# Patient Record
Sex: MALE | Race: WHITE | NOT HISPANIC OR LATINO | Employment: FULL TIME | URBAN - METROPOLITAN AREA
[De-identification: names, ages, dates, MRNs, and addresses within clinical notes are randomized per-mention and may not be internally consistent; named-entity substitution may affect disease eponyms.]

---

## 2018-01-04 ENCOUNTER — OFFICE VISIT (OUTPATIENT)
Dept: URGENT CARE | Facility: CLINIC | Age: 43
End: 2018-01-04
Payer: COMMERCIAL

## 2018-01-04 VITALS
DIASTOLIC BLOOD PRESSURE: 64 MMHG | RESPIRATION RATE: 16 BRPM | HEIGHT: 68 IN | HEART RATE: 72 BPM | BODY MASS INDEX: 34.25 KG/M2 | OXYGEN SATURATION: 99 % | SYSTOLIC BLOOD PRESSURE: 128 MMHG | WEIGHT: 226 LBS | TEMPERATURE: 97.8 F

## 2018-01-04 DIAGNOSIS — H66.002 ACUTE SUPPURATIVE OTITIS MEDIA OF LEFT EAR WITHOUT SPONTANEOUS RUPTURE OF TYMPANIC MEMBRANE, RECURRENCE NOT SPECIFIED: ICD-10-CM

## 2018-01-04 DIAGNOSIS — E66.9 OBESITY (BMI 30-39.9): ICD-10-CM

## 2018-01-04 PROCEDURE — 99214 OFFICE O/P EST MOD 30 MIN: CPT | Performed by: PHYSICIAN ASSISTANT

## 2018-01-04 RX ORDER — AMOXICILLIN AND CLAVULANATE POTASSIUM 875; 125 MG/1; MG/1
1 TABLET, FILM COATED ORAL 2 TIMES DAILY
Qty: 14 TAB | Refills: 0 | Status: SHIPPED | OUTPATIENT
Start: 2018-01-04 | End: 2018-01-04 | Stop reason: SDUPTHER

## 2018-01-04 RX ORDER — AMOXICILLIN AND CLAVULANATE POTASSIUM 875; 125 MG/1; MG/1
1 TABLET, FILM COATED ORAL 2 TIMES DAILY
Qty: 14 TAB | Refills: 0 | Status: SHIPPED | OUTPATIENT
Start: 2018-01-04 | End: 2018-01-11

## 2018-01-04 ASSESSMENT — ENCOUNTER SYMPTOMS
CHILLS: 0
FEVER: 0
COUGH: 1
SHORTNESS OF BREATH: 0
SORE THROAT: 1
EYE PAIN: 0
PALPITATIONS: 0

## 2018-01-04 NOTE — PROGRESS NOTES
Subjective:      Moncho Mccall is a 42 y.o. male who presents with Otalgia (L>R ear pain x 3 days)            Otalgia    There is pain in the left ear. This is a new problem. The current episode started in the past 7 days. The problem occurs constantly. The problem has been unchanged. The pain is moderate. Associated symptoms include coughing and a sore throat. Pertinent negatives include no ear discharge or hearing loss. He has tried NSAIDs for the symptoms. The treatment provided mild relief.       Review of Systems   Constitutional: Negative for chills and fever.   HENT: Positive for congestion, ear pain and sore throat. Negative for ear discharge, hearing loss and tinnitus.    Eyes: Negative for pain.   Respiratory: Positive for cough. Negative for shortness of breath.    Cardiovascular: Negative for chest pain and palpitations.   All other systems reviewed and are negative.    PMH:  has a past medical history of Anesthesia; Arthritis; ASTHMA; Bipolar 1 disorder (CMS-HCC); Dental disorder; Family history of psychiatric condition; Heart burn; and Pain.  MEDS:   Current Outpatient Prescriptions:   •  amoxicillin-clavulanate (AUGMENTIN) 875-125 MG Tab, Take 1 Tab by mouth 2 times a day for 7 days., Disp: 14 Tab, Rfl: 0  •  alprazolam (XANAX XR) 2 MG XR tablet, Take 1 Tab by mouth 2 Times a Day., Disp: , Rfl:   •  Iloperidone (FANAPT) 8 MG Tab, Take 8 mg by mouth 2 Times a Day., Disp: 60 Tab, Rfl:   •  lamotrigine (LAMICTAL) 100 MG TABS, Take 200 mg by mouth 2 times a day.  , Disp: , Rfl:   •  budesonide (RINOCORT AQUA) 32 MCG/ACT nasal spray, Spray 2 Sprays in nose every day., Disp: , Rfl: 11  •  fexofenadine (ALLEGRA) 180 MG tablet, Take 1 Tab by mouth every day., Disp: 30 Tab, Rfl: 3  •  MethylPREDNISolone (MEDROL DOSEPAK) 4 MG Tablet Therapy Pack, Take 1 Tab by mouth every day., Disp: 21 Tab, Rfl: 0  ALLERGIES:   Allergies   Allergen Reactions   • Wellbutrin [Bupropion Hcl] Anaphylaxis   • Latex      Hives,  "rash with contact   • Statins [Hmg-Coa-R Inhibitors]      SURGHX:   Past Surgical History:   Procedure Laterality Date   • TIBIA ORIF  4/9/08    Performed by DIANE CAMPOS at SURGERY [facility]   • KNEE ARTHROSCOPY  4/9/08    Performed by DIANE CAMPOS at SURGERY [facility]   • ARTHROSCOPY, KNEE  1989, 1981, 2001    left, left, right   • KNEE ARTHROTOMY  2001,2002    right, left     SOCHX:  reports that he has been smoking Cigarettes.  He has been smoking about 0.50 packs per day. He uses smokeless tobacco. He reports that he does not drink alcohol or use drugs.  FH: Family history was reviewed, no pertinent findings to report  Medications, Allergies, and current problem list reviewed today in Epic       Objective:     /64   Pulse 72   Temp 36.6 °C (97.8 °F)   Resp 16   Ht 1.727 m (5' 8\")   Wt 102.5 kg (226 lb)   SpO2 99%   BMI 34.36 kg/m²      Physical Exam   Constitutional: He is oriented to person, place, and time. He appears well-developed and well-nourished. He is active.  Non-toxic appearance. He does not have a sickly appearance. He does not appear ill. No distress.   HENT:   Head: Normocephalic and atraumatic.   Right Ear: Hearing, tympanic membrane, external ear and ear canal normal.   Left Ear: Hearing, external ear and ear canal normal. Tympanic membrane is erythematous and bulging.   Nose: Nose normal.   Mouth/Throat: Uvula is midline, oropharynx is clear and moist and mucous membranes are normal.   Eyes: Conjunctivae and lids are normal. Right eye exhibits no discharge. Left eye exhibits no discharge.   Neck: Normal range of motion. Neck supple.   Cardiovascular: Normal rate, regular rhythm and normal heart sounds.  Exam reveals no gallop and no friction rub.    No murmur heard.  Pulmonary/Chest: Effort normal and breath sounds normal. No respiratory distress. He has no decreased breath sounds. He has no wheezes. He has no rhonchi. He has no rales. He exhibits no " tenderness.   Musculoskeletal: Normal range of motion.   Neurological: He is alert and oriented to person, place, and time.   Skin: Skin is warm, dry and intact.   Psychiatric: He has a normal mood and affect. His speech is normal and behavior is normal. Judgment and thought content normal.   Vitals reviewed.              Assessment/Plan:     1. Acute suppurative otitis media of left ear without spontaneous rupture of tympanic membrane, recurrence not specified    - amoxicillin-clavulanate (AUGMENTIN) 875-125 MG Tab; Take 1 Tab by mouth 2 times a day for 7 days.  Dispense: 14 Tab; Refill: 0    2. Obesity (BMI 30-39.9)    - Patient identified as having weight management issue.  Appropriate orders and counseling given.    Differential diagnosis, natural history, supportive care, and indications for immediate follow-up discussed at length.   Follow-up with primary care provider within 4-5 days, emergency room precautions discussed.  Patient and/or family appears understanding of information.

## 2018-07-18 ENCOUNTER — HOSPITAL ENCOUNTER (EMERGENCY)
Facility: MEDICAL CENTER | Age: 43
End: 2018-07-18
Attending: EMERGENCY MEDICINE
Payer: COMMERCIAL

## 2018-07-18 ENCOUNTER — APPOINTMENT (OUTPATIENT)
Dept: RADIOLOGY | Facility: MEDICAL CENTER | Age: 43
End: 2018-07-18
Attending: EMERGENCY MEDICINE
Payer: COMMERCIAL

## 2018-07-18 VITALS
WEIGHT: 236 LBS | SYSTOLIC BLOOD PRESSURE: 111 MMHG | HEART RATE: 70 BPM | RESPIRATION RATE: 16 BRPM | DIASTOLIC BLOOD PRESSURE: 72 MMHG | HEIGHT: 68 IN | TEMPERATURE: 97.5 F | OXYGEN SATURATION: 97 % | BODY MASS INDEX: 35.77 KG/M2

## 2018-07-18 DIAGNOSIS — M25.562 ACUTE PAIN OF LEFT KNEE: ICD-10-CM

## 2018-07-18 PROCEDURE — 99284 EMERGENCY DEPT VISIT MOD MDM: CPT

## 2018-07-18 PROCEDURE — 73564 X-RAY EXAM KNEE 4 OR MORE: CPT | Mod: LT

## 2018-07-18 PROCEDURE — 700102 HCHG RX REV CODE 250 W/ 637 OVERRIDE(OP): Performed by: EMERGENCY MEDICINE

## 2018-07-18 PROCEDURE — A9270 NON-COVERED ITEM OR SERVICE: HCPCS | Performed by: EMERGENCY MEDICINE

## 2018-07-18 RX ORDER — CETIRIZINE HYDROCHLORIDE 10 MG/1
10 TABLET ORAL DAILY
COMMUNITY
End: 2021-04-06

## 2018-07-18 RX ORDER — HYDROCODONE BITARTRATE AND ACETAMINOPHEN 5; 325 MG/1; MG/1
1 TABLET ORAL ONCE
Status: COMPLETED | OUTPATIENT
Start: 2018-07-18 | End: 2018-07-18

## 2018-07-18 RX ADMIN — HYDROCODONE BITARTRATE AND ACETAMINOPHEN 1 TABLET: 5; 325 TABLET ORAL at 10:13

## 2018-07-18 ASSESSMENT — PAIN SCALES - GENERAL: PAINLEVEL_OUTOF10: 8

## 2018-07-18 NOTE — ED TRIAGE NOTES
"Chief Complaint   Patient presents with   • Knee Pain     BIB EMS from home - to triage via wheelchair. Pt states he was walking up stairs and his left knee gave out. Hx prior knee surgeries. States he was unable to bear weight on L leg after incident.     /73   Pulse 73   Temp 36.4 °C (97.5 °F)   Resp 16   Ht 1.727 m (5' 8\")   Wt 107 kg (236 lb)   SpO2 97%   BMI 35.88 kg/m²     Pt Informed regarding triage process and verbalized understanding to inform triage tech or RN for any changes in condition.  Placed in lobby.    "

## 2018-07-18 NOTE — ED PROVIDER NOTES
"ED Provider Note      CHIEF COMPLAINT   Chief Complaint   Patient presents with   • Knee Pain       HPI   Moncho Mccall is a 42 y.o. male who presents with left knee pain.  Has a history of multiple knee injuries on both knees.  Has had patellar dislocations, cartilage injury and has a partial left ACL tear.  He is walking up steps felt sudden immediate pain underneath the kneecap.  Worse with any movement.  Has not noticed any swelling.  Pain is severe.  Cannot bear weight.    REVIEW OF SYSTEMS   Pertinent negative: As above    PAST MEDICAL HISTORY   Past Medical History:   Diagnosis Date   • Anesthesia     woke up w/asthma attack   • Arthritis     joints   • ASTHMA    • Bipolar 1 disorder (CMS-HCC) (HCC)    • Dental disorder    • Family history of psychiatric condition     adhd, takes med for anger mgmt/depression   • Heart burn    • Pain        SOCIAL HISTORY  Social History   Substance Use Topics   • Smoking status: Current Every Day Smoker     Packs/day: 0.50     Types: Cigarettes   • Smokeless tobacco: Current User      Comment: 1/2-3/4 ppd   • Alcohol use No       ALLERGIES   See chart    PHYSICAL EXAM  VITAL SIGNS: /73   Pulse 73   Temp 36.4 °C (97.5 °F)   Resp 16   Ht 1.727 m (5' 8\")   Wt 107 kg (236 lb)   SpO2 97%   BMI 35.88 kg/m²   Head: Atraumatic  Eyes: Eyes normal inspection  Neck: has full range of motion, normal inspection.  Constitutional: No acute distress   Cardiovascular: Normal heart rate. Pulses strong posterior tibial  Thorax & Lungs: No respiratory distress  Skin: Anterior surgical scar noted.  Musculoskeletal: No left joint effusion.  Patella is well aligned.  He has tenderness diffusely.  Limited flexion.  Maintains full extension.  No overt ligamentous instability although patient resists any movement.  Neurologic:  Normal sensory and motor    RADIOLOGY/PROCEDURES  DX-KNEE COMPLETE 4+ LEFT   Final Result      No evidence of fracture or dislocation.                "   INTERPRETING LOCATION:  [address] [place] [address], [id number]         imaging is interpreted by radiologist reviewed by me    COURSE & MEDICAL DECISION MAKING  Analgesia for possible fracture-Norco    Patient presents with knee pain.  Does not have overt ligamentous instability.  No effusion.  No fever.  Has a history of chronic recurrent knee injuries.  He is placed on crutches after negative x-ray.  He will be referred to orthopedics for further evaluation.  Advised NSAIDs as needed.  Return to the ER for uncontrolled symptoms or concern.    Patient advised to see a primary provider for blood pressure management    FINAL IMPRESSION  1.  Left knee pain, sprain      This dictation was created using voice recognition software. The accuracy of the dictation is limited to the abilities of the software. I expect there may be some errors of grammar and possibly content. The nursing notes were reviewed and certain aspects of this information were incorporated into this note.      Electronically signed by: Cholo Hobbs, 7/18/2018 9:53 AM

## 2018-07-18 NOTE — DISCHARGE INSTRUCTIONS
Knee Pain  Introduction  Knee pain is a common problem. It can have many causes. The pain often goes away by following your doctor's home care instructions. Treatment for ongoing pain will depend on the cause of your pain. If your knee pain continues, more tests may be needed to diagnose your condition. Tests may include X-rays or other imaging studies of your knee.  Follow these instructions at home:  · Take medicines only as told by your doctor.  · Rest your knee and keep it raised (elevated) while you are resting.  · Do not do things that cause pain or make your pain worse.  · Avoid activities where both feet leave the ground at the same time, such as running, jumping rope, or doing jumping jacks.  · Apply ice to the knee area:  ¨ Put ice in a plastic bag.  ¨ Place a towel between your skin and the bag.  ¨ Leave the ice on for 20 minutes, 2-3 times a day.  · Ask your doctor if you should wear an elastic knee support.  · Sleep with a pillow under your knee.  · Lose weight if you are overweight. Being overweight can make your knee hurt more.  · Do not use any tobacco products, including cigarettes, chewing tobacco, or electronic cigarettes. If you need help quitting, ask your doctor. Smoking may slow the healing of any bone and joint problems that you may have.  Contact a doctor if:  · Your knee pain does not stop, it changes, or it gets worse.  · You have a fever along with knee pain.  · Your knee gives out or locks up.  · Your knee becomes more swollen.  Get help right away if:  · Your knee feels hot to the touch.  · You have chest pain or trouble breathing.  This information is not intended to replace advice given to you by your health care provider. Make sure you discuss any questions you have with your health care provider.  Document Released: 03/16/2010 Document Revised: 05/25/2017 Document Reviewed: 02/18/2015  © 2017 [employer]

## 2018-09-15 ENCOUNTER — HOSPITAL ENCOUNTER (EMERGENCY)
Facility: MEDICAL CENTER | Age: 43
End: 2018-09-15
Attending: EMERGENCY MEDICINE
Payer: COMMERCIAL

## 2018-09-15 VITALS
SYSTOLIC BLOOD PRESSURE: 127 MMHG | WEIGHT: 224 LBS | HEART RATE: 56 BPM | HEIGHT: 68 IN | BODY MASS INDEX: 33.95 KG/M2 | OXYGEN SATURATION: 98 % | RESPIRATION RATE: 14 BRPM | TEMPERATURE: 96.9 F | DIASTOLIC BLOOD PRESSURE: 68 MMHG

## 2018-09-15 DIAGNOSIS — M79.89 SWELLING OF LIMB: ICD-10-CM

## 2018-09-15 DIAGNOSIS — T81.40XA POSTOPERATIVE INFECTION, INITIAL ENCOUNTER: ICD-10-CM

## 2018-09-15 LAB
ALBUMIN SERPL BCP-MCNC: 4 G/DL (ref 3.2–4.9)
ALBUMIN/GLOB SERPL: 1.5 G/DL
ALP SERPL-CCNC: 121 U/L (ref 30–99)
ALT SERPL-CCNC: 21 U/L (ref 2–50)
ANION GAP SERPL CALC-SCNC: 8 MMOL/L (ref 0–11.9)
AST SERPL-CCNC: 25 U/L (ref 12–45)
BASOPHILS # BLD AUTO: 0.9 % (ref 0–1.8)
BASOPHILS # BLD: 0.09 K/UL (ref 0–0.12)
BILIRUB SERPL-MCNC: 0.5 MG/DL (ref 0.1–1.5)
BUN SERPL-MCNC: 15 MG/DL (ref 8–22)
CALCIUM SERPL-MCNC: 9.1 MG/DL (ref 8.5–10.5)
CHLORIDE SERPL-SCNC: 103 MMOL/L (ref 96–112)
CO2 SERPL-SCNC: 25 MMOL/L (ref 20–33)
CREAT SERPL-MCNC: 1.02 MG/DL (ref 0.5–1.4)
EOSINOPHIL # BLD AUTO: 0.51 K/UL (ref 0–0.51)
EOSINOPHIL NFR BLD: 5 % (ref 0–6.9)
ERYTHROCYTE [DISTWIDTH] IN BLOOD BY AUTOMATED COUNT: 44 FL (ref 35.9–50)
GLOBULIN SER CALC-MCNC: 2.7 G/DL (ref 1.9–3.5)
GLUCOSE SERPL-MCNC: 81 MG/DL (ref 65–99)
HCT VFR BLD AUTO: 39.5 % (ref 42–52)
HGB BLD-MCNC: 12.9 G/DL (ref 14–18)
IMM GRANULOCYTES # BLD AUTO: 0.04 K/UL (ref 0–0.11)
IMM GRANULOCYTES NFR BLD AUTO: 0.4 % (ref 0–0.9)
LYMPHOCYTES # BLD AUTO: 2.82 K/UL (ref 1–4.8)
LYMPHOCYTES NFR BLD: 27.5 % (ref 22–41)
MCH RBC QN AUTO: 31.7 PG (ref 27–33)
MCHC RBC AUTO-ENTMCNC: 32.7 G/DL (ref 33.7–35.3)
MCV RBC AUTO: 97.1 FL (ref 81.4–97.8)
MONOCYTES # BLD AUTO: 0.81 K/UL (ref 0–0.85)
MONOCYTES NFR BLD AUTO: 7.9 % (ref 0–13.4)
NEUTROPHILS # BLD AUTO: 5.99 K/UL (ref 1.82–7.42)
NEUTROPHILS NFR BLD: 58.3 % (ref 44–72)
NRBC # BLD AUTO: 0 K/UL
NRBC BLD-RTO: 0 /100 WBC
PLATELET # BLD AUTO: 233 K/UL (ref 164–446)
PMV BLD AUTO: 9 FL (ref 9–12.9)
POTASSIUM SERPL-SCNC: 4.5 MMOL/L (ref 3.6–5.5)
PROT SERPL-MCNC: 6.7 G/DL (ref 6–8.2)
RBC # BLD AUTO: 4.07 M/UL (ref 4.7–6.1)
SODIUM SERPL-SCNC: 136 MMOL/L (ref 135–145)
WBC # BLD AUTO: 10.3 K/UL (ref 4.8–10.8)

## 2018-09-15 PROCEDURE — 99284 EMERGENCY DEPT VISIT MOD MDM: CPT

## 2018-09-15 PROCEDURE — 93971 EXTREMITY STUDY: CPT

## 2018-09-15 PROCEDURE — 85025 COMPLETE CBC W/AUTO DIFF WBC: CPT

## 2018-09-15 PROCEDURE — 80053 COMPREHEN METABOLIC PANEL: CPT

## 2018-09-15 RX ORDER — SULFAMETHOXAZOLE AND TRIMETHOPRIM 800; 160 MG/1; MG/1
1 TABLET ORAL 2 TIMES DAILY
Qty: 14 TAB | Refills: 0 | Status: SHIPPED | OUTPATIENT
Start: 2018-09-15 | End: 2021-03-08

## 2018-09-15 ASSESSMENT — PAIN SCALES - GENERAL: PAINLEVEL_OUTOF10: 7

## 2018-09-15 NOTE — ED NOTES
Patient discharged with family, prescription provided.  Patient verbalizes understanding of medication usage and follow up with ortho.

## 2018-09-15 NOTE — ED PROVIDER NOTES
ED Provider Note    Scribed for Dr. Reinier Nolan M.D. by Ana Canas. 9/15/2018  2:01 PM    Primary care provider: KATHLEEN Land  Means of arrival: walk-in  History obtained from: patient  History limited by: none    CHIEF COMPLAINT  Chief Complaint   Patient presents with   • Leg Swelling     in left lower extremity. noted redness/swelling/tenderness in lle. pt states that he is to get checked out for possible DVT.       HPI  Moncho Mccall is a 42 y.o. male who presents to the Emergency Department 5 days status post left knee surgery completed by Dr. Corea, Orthopedics. Patient has been experiencing normal post op bruising, pain and swelling since the procedure receiving a brace adjustment 4 days ago. However, beginning yesterday, patient began experiencing severe pain, swelling, and redness along the extremity, with redness mainly along the anterior leg and pain mainly along the left calf. He also experienced a mild fever 2 days ago, however, true severity of this is unknown, as he has been taking both Tylenol and Motrin to manage his discomfort at home. No complaints of chest pain, shortness of breath.    REVIEW OF SYSTEMS  Pertinent positives include left leg pain, swelling, redness, fever. Pertinent negatives include no chest pain, shortness of breath. As above, all other systems reviewed and are negative.   See HPI for further details.     PAST MEDICAL HISTORY   has a past medical history of Anesthesia; Arthritis; ASTHMA; Bipolar 1 disorder (HCC); Dental disorder; Family history of psychiatric condition; Heart burn; and Pain.    SURGICAL HISTORY   has a past surgical history that includes arthroscopy, knee (1989, 1981, 2001); knee arthrotomy (2001,2002); tibia orif (4/9/08); and knee arthroscopy (4/9/08).    SOCIAL HISTORY  Social History   Substance Use Topics   • Smoking status: Current Every Day Smoker     Packs/day: 0.50     Types: Cigarettes   • Smokeless tobacco: Current User      Comment:  "1/2-3/4 ppd   • Alcohol use No      History   Drug Use No       FAMILY HISTORY  Family History   Problem Relation Age of Onset   • Diabetes Mother    • Hypertension Mother    • Heart Disease Father        CURRENT MEDICATIONS  No current facility-administered medications for this encounter.     Current Outpatient Prescriptions:   •  sulfamethoxazole-trimethoprim (BACTRIM DS) 800-160 MG tablet, Take 1 Tab by mouth 2 times a day., Disp: 14 Tab, Rfl: 0  •  cetirizine (ZYRTEC) 10 MG Tab, Take 10 mg by mouth every day., Disp: , Rfl:   •  budesonide (RINOCORT AQUA) 32 MCG/ACT nasal spray, Spray 2 Sprays in nose every day., Disp: , Rfl: 11  •  alprazolam (XANAX XR) 2 MG XR tablet, Take 1 Tab by mouth 2 Times a Day., Disp: , Rfl:   •  Iloperidone (FANAPT) 8 MG Tab, Take 8 mg by mouth 2 Times a Day., Disp: 60 Tab, Rfl:   •  lamotrigine (LAMICTAL) 100 MG TABS, Take 200 mg by mouth 2 times a day.  , Disp: , Rfl:     ALLERGIES  Allergies   Allergen Reactions   • Wellbutrin [Bupropion Hcl] Anaphylaxis   • Latex      Hives, rash with contact   • Statins [Hmg-Coa-R Inhibitors]        PHYSICAL EXAM  VITAL SIGNS: /88   Pulse 68   Temp 36.1 °C (96.9 °F)   Resp 20   Ht 1.727 m (5' 8\")   Wt 101.6 kg (224 lb)   SpO2 98%   BMI 34.06 kg/m²     Constitutional: Well developed, Well nourished, mild uncomfortable distress, Non-toxic appearance.   HENT: Normocephalic, Atraumatic, Bilateral external ears normal  Eyes: PERRLA, EOMI, Conjunctiva normal, No discharge.   Neck: Supple  Cardiovascular: Normal perfusion  Thorax & Lungs: No respiratory distress   Skin: Warm, Dry, redness around the incision site and just below the L knee  Extremities:, swelling just below the left knee and around the surgical site with left calf tenderness. No cyanosis.   Musculoskeletal: swelling just below the left knee with left calf tenderness Intact distal pulses  Neurologic: Awake, alert. Moves all extremities spontaneously.  Psychiatric: Affect " normal, Judgment normal, Mood normal.     LABS  Results for orders placed or performed during the hospital encounter of 09/15/18   CBC WITH DIFFERENTIAL   Result Value Ref Range    WBC 10.3 4.8 - 10.8 K/uL    RBC 4.07 (L) 4.70 - 6.10 M/uL    Hemoglobin 12.9 (L) 14.0 - 18.0 g/dL    Hematocrit 39.5 (L) 42.0 - 52.0 %    MCV 97.1 81.4 - 97.8 fL    MCH 31.7 27.0 - 33.0 pg    MCHC 32.7 (L) 33.7 - 35.3 g/dL    RDW 44.0 35.9 - 50.0 fL    Platelet Count 233 164 - 446 K/uL    MPV 9.0 9.0 - 12.9 fL    Neutrophils-Polys 58.30 44.00 - 72.00 %    Lymphocytes 27.50 22.00 - 41.00 %    Monocytes 7.90 0.00 - 13.40 %    Eosinophils 5.00 0.00 - 6.90 %    Basophils 0.90 0.00 - 1.80 %    Immature Granulocytes 0.40 0.00 - 0.90 %    Nucleated RBC 0.00 /100 WBC    Neutrophils (Absolute) 5.99 1.82 - 7.42 K/uL    Lymphs (Absolute) 2.82 1.00 - 4.80 K/uL    Monos (Absolute) 0.81 0.00 - 0.85 K/uL    Eos (Absolute) 0.51 0.00 - 0.51 K/uL    Baso (Absolute) 0.09 0.00 - 0.12 K/uL    Immature Granulocytes (abs) 0.04 0.00 - 0.11 K/uL    NRBC (Absolute) 0.00 K/uL   COMP METABOLIC PANEL   Result Value Ref Range    Sodium 136 135 - 145 mmol/L    Potassium 4.5 3.6 - 5.5 mmol/L    Chloride 103 96 - 112 mmol/L    Co2 25 20 - 33 mmol/L    Anion Gap 8.0 0.0 - 11.9    Glucose 81 65 - 99 mg/dL    Bun 15 8 - 22 mg/dL    Creatinine 1.02 0.50 - 1.40 mg/dL    Calcium 9.1 8.5 - 10.5 mg/dL    AST(SGOT) 25 12 - 45 U/L    ALT(SGPT) 21 2 - 50 U/L    Alkaline Phosphatase 121 (H) 30 - 99 U/L    Total Bilirubin 0.5 0.1 - 1.5 mg/dL    Albumin 4.0 3.2 - 4.9 g/dL    Total Protein 6.7 6.0 - 8.2 g/dL    Globulin 2.7 1.9 - 3.5 g/dL    A-G Ratio 1.5 g/dL   ESTIMATED GFR   Result Value Ref Range    GFR If African American >60 >60 mL/min/1.73 m 2    GFR If Non African American >60 >60 mL/min/1.73 m 2       All labs reviewed by me.    RADIOLOGY  LE VENOUS DUPLEX (Specify in Comments Left, Right Or Bilateral)   Final Result        The radiologist's interpretation of all radiological  studies have been reviewed by me.    COURSE & MEDICAL DECISION MAKING  Pertinent Labs & Imaging studies reviewed. (See chart for details)    2:01 PM - Patient seen and examined at bedside. Ordered le venous duplex, left lower extremity to evaluate his symptoms. The differential diagnoses include but are not limited to: DVT, infection, normal post op pain. I informed the patient that I would evaluate for acute processes with imaging and lab work. He understands and agrees with treatment plan.    3:33 PM I re-evaluated patient at bedside and informed him of his work up results. I explained that there were no indications for any acute process at this time, explaining that I would contact the surgeon who performed his surgery and discuss the next appropriate evaluation steps. He understands and agrees with treatment plan.    3:50 PM Paged Dr. Corea, Orthopedics    4:01 PM Spoke with Dr. Martinez, Orthopedics, about the patient's condition. He advises placing the patient on antibiotics and discharging with a follow up with Dr. Corea on Monday. Patient was informed of this and agrees with treatment plan and discharge.    Decision Making:  Patient presents with relatively normal vital signs in no respiratory but mild uncomfortable painful distress, 5 days status post left knee surgery, complaining of severe pain, redness and increased swelling to his left lower leg, worse than all post op discomfort he has been experiencing. Le Venous Duplex does not indicate any occlusions at this time, and lab work does not indicate a serious or developing infection.     The patient will return for new or worsening symptoms and is stable at the time of discharge.    DISPOSITION:  Patient will be discharged home in stable condition.    FOLLOW UP:  No follow-up provider specified.    OUTPATIENT MEDICATIONS:  New Prescriptions    SULFAMETHOXAZOLE-TRIMETHOPRIM (BACTRIM DS) 800-160 MG TABLET    Take 1 Tab by mouth 2 times a day.         FINAL  IMPRESSION  1. Swelling of limb    2. Postoperative infection, initial encounter          IAna (Scribe), am scribing for, and in the presence of, Reinier Nolan M.D..    Electronically signed by: Ana Canas (Scribe), 9/15/2018    IReinier M.D. personally performed the services described in this documentation, as scribed by Ana Canas in my presence, and it is both accurate and complete. C    The note accurately reflects work and decisions made by me.  Reinier Nolan  9/15/2018  5:59 PM

## 2018-09-15 NOTE — ED TRIAGE NOTES
Chief Complaint   Patient presents with   • Leg Swelling     in left lower extremity. noted redness/swelling/tenderness in lle. pt states that he is to get checked out for possible DVT.     Had knee surgery done Monday. Denies chest pain/sob. Educated on triage process.

## 2018-09-15 NOTE — ED NOTES
Left lower leg swelling, surgery Monday.  Noticed swelling and redness became worse starting yesterday.  Pulses strong.  Leg red, normal temp in extremity

## 2019-08-25 ENCOUNTER — HOSPITAL ENCOUNTER (EMERGENCY)
Facility: MEDICAL CENTER | Age: 44
End: 2019-08-26
Attending: EMERGENCY MEDICINE
Payer: COMMERCIAL

## 2019-08-25 ENCOUNTER — APPOINTMENT (OUTPATIENT)
Dept: RADIOLOGY | Facility: MEDICAL CENTER | Age: 44
End: 2019-08-25
Attending: EMERGENCY MEDICINE
Payer: COMMERCIAL

## 2019-08-25 DIAGNOSIS — R51.9 ACUTE NONINTRACTABLE HEADACHE, UNSPECIFIED HEADACHE TYPE: ICD-10-CM

## 2019-08-25 DIAGNOSIS — F41.8 SITUATIONAL ANXIETY: ICD-10-CM

## 2019-08-25 DIAGNOSIS — R47.9 SPEECH DISTURBANCE, UNSPECIFIED TYPE: ICD-10-CM

## 2019-08-25 LAB — EKG IMPRESSION: NORMAL

## 2019-08-25 PROCEDURE — 96375 TX/PRO/DX INJ NEW DRUG ADDON: CPT

## 2019-08-25 PROCEDURE — 99284 EMERGENCY DEPT VISIT MOD MDM: CPT

## 2019-08-25 PROCEDURE — 93005 ELECTROCARDIOGRAM TRACING: CPT

## 2019-08-25 PROCEDURE — 70450 CT HEAD/BRAIN W/O DYE: CPT

## 2019-08-25 PROCEDURE — 700111 HCHG RX REV CODE 636 W/ 250 OVERRIDE (IP): Performed by: EMERGENCY MEDICINE

## 2019-08-25 PROCEDURE — 93005 ELECTROCARDIOGRAM TRACING: CPT | Performed by: EMERGENCY MEDICINE

## 2019-08-25 PROCEDURE — 96374 THER/PROPH/DIAG INJ IV PUSH: CPT

## 2019-08-25 RX ORDER — LORAZEPAM 2 MG/ML
0.5 INJECTION INTRAMUSCULAR ONCE
Status: COMPLETED | OUTPATIENT
Start: 2019-08-25 | End: 2019-08-25

## 2019-08-25 RX ORDER — DIPHENHYDRAMINE HYDROCHLORIDE 50 MG/ML
25 INJECTION INTRAMUSCULAR; INTRAVENOUS ONCE
Status: DISCONTINUED | OUTPATIENT
Start: 2019-08-25 | End: 2019-08-26 | Stop reason: HOSPADM

## 2019-08-25 RX ORDER — KETOROLAC TROMETHAMINE 30 MG/ML
15 INJECTION, SOLUTION INTRAMUSCULAR; INTRAVENOUS ONCE
Status: COMPLETED | OUTPATIENT
Start: 2019-08-25 | End: 2019-08-25

## 2019-08-25 RX ORDER — PROCHLORPERAZINE EDISYLATE 5 MG/ML
10 INJECTION INTRAMUSCULAR; INTRAVENOUS ONCE
Status: COMPLETED | OUTPATIENT
Start: 2019-08-25 | End: 2019-08-25

## 2019-08-25 RX ADMIN — KETOROLAC TROMETHAMINE 15 MG: 30 INJECTION, SOLUTION INTRAMUSCULAR at 22:28

## 2019-08-25 RX ADMIN — PROCHLORPERAZINE EDISYLATE 10 MG: 5 INJECTION INTRAMUSCULAR; INTRAVENOUS at 22:28

## 2019-08-25 RX ADMIN — LORAZEPAM 0.5 MG: 2 INJECTION INTRAMUSCULAR; INTRAVENOUS at 22:28

## 2019-08-26 VITALS
OXYGEN SATURATION: 95 % | WEIGHT: 220.46 LBS | HEART RATE: 57 BPM | TEMPERATURE: 97.2 F | SYSTOLIC BLOOD PRESSURE: 108 MMHG | BODY MASS INDEX: 33.41 KG/M2 | RESPIRATION RATE: 17 BRPM | DIASTOLIC BLOOD PRESSURE: 73 MMHG | HEIGHT: 68 IN

## 2019-08-26 NOTE — DISCHARGE INSTRUCTIONS
Your head CT did not show any abnormality.  Since you are feeling better after medications, it is safe to send you home.  Please schedule follow-up with your primary care doctor.  Although your symptoms were most likely from stress, it is important to schedule follow-up, and to return here for new or worsening symptoms.

## 2019-08-26 NOTE — ED NOTES
"Pt reports some chest pain/discomfort , described as \"someone sitting on chest\". Pt has anxiety and reports this could be the beginning of an attack- takes Xanax. Pt also reports HA was 3/10 upon arrival to ED- current 4/10. Headache located temporal to posterior- both sides.  No vision changes, denies \"wrst HA ever\".   "

## 2019-08-26 NOTE — ED TRIAGE NOTES
Moncho Mccall  43 y.o.  male  Chief Complaint   Patient presents with   • Headache     constant pressure   • Aphasia     upon waking up 2 hrs ago     Present to triage states that patient went to sleep around 11 am and woke up 2 hrs ago and unable to speak.  = and strong. No facial droop. + headache 3/10. No vomiting. Denies sore throat.

## 2019-08-26 NOTE — ED PROVIDER NOTES
"ED Provider Note    Scribed for Ignacio Dixon M.D. by Ignacio Dixon. 8/25/2019,  10:05 PM.    CHIEF COMPLAINT  Chief Complaint   Patient presents with   • Headache     constant pressure   • Aphasia     upon waking up 2 hrs ago       HPI  Moncho Mccall is a 43 y.o. male who presents to the Emergency Department reporting that he has been unable to speak since waking up from sleep 2 hours ago.  He works the night shift, went to bed around 11 AM, and woke up late p.m., which is now 2 hours and 15 minutes ago, and found himself unable to speak.  He has a typed note which he ably pulls up on his cell phone that states \"I have been holding back my emotions for 2 days,\" he becomes tearful when talking.  He has not had any head trauma.  Is not anticoagulated.  He has a history of bipolar disorder as well as panic/anxiety.  He has had no strokes.  He denies feeling confused.  He answers all questions appropriately with yes or no answers and with typing.  He also makes all appropriate shapes of words with his mouth.  His  are equal.  There is no facial droop.  He denies any sore throat or cough.  He reports an associated 3-4 out of 10 headache.  He has had intermittent chest discomfort which she described as \"someone sitting on his chest,\" but says that is resolved.  He denies vision changes or that this was a sudden onset of headache or worst headache ever.      REVIEW OF SYSTEMS  See HPI for further details. All other systems are negative.     PAST MEDICAL HISTORY   has a past medical history of Anesthesia, Arthritis, ASTHMA, Bipolar 1 disorder (HCC), Dental disorder, Family history of psychiatric condition, Heart burn, and Pain.    SOCIAL HISTORY  Social History     Tobacco Use   • Smoking status: Current Every Day Smoker     Packs/day: 0.50     Types: Cigarettes   • Smokeless tobacco: Current User   • Tobacco comment: 1/2-3/4 ppd   Substance and Sexual Activity   • Alcohol use: No   • Drug use: No   • " "Sexual activity: Yes     Partners: Female     Social History     Substance and Sexual Activity   Drug Use No       SURGICAL HISTORY   has a past surgical history that includes arthroscopy, knee (1989, 1981, 2001); knee arthrotomy (2001,2002); tibia orif (4/9/08); and knee arthroscopy (4/9/08).    CURRENT MEDICATIONS  Home Medications    **Home medications have not yet been reviewed for this encounter**         ALLERGIES  Allergies   Allergen Reactions   • Wellbutrin [Bupropion Hcl] Anaphylaxis   • Antihistamines, Loratadine-Type [Piperidines]    • Latex      Hives, rash with contact   • Statins [Hmg-Coa-R Inhibitors]        PHYSICAL EXAM  VITAL SIGNS: /73   Pulse (!) 57   Temp 36.2 °C (97.2 °F) (Temporal)   Resp 17   Ht 1.727 m (5' 8\")   Wt 100 kg (220 lb 7.4 oz)   SpO2 95%   BMI 33.52 kg/m²   Pulse ox interpretation: I interpret this pulse ox as normal.  Constitutional: Alert in some emotional distress.  HENT: No signs of trauma, Bilateral external ears normal, Nose normal.   Eyes: Conjunctiva normal, Non-icteric.   Neck: Normal range of motion, Supple, No stridor.   Lymphatic: No lymphadenopathy noted.   Cardiovascular: Regular rate and rhythm, no murmurs.   Thorax & Lungs: Normal breath sounds, No respiratory distress, No wheezing, No chest tenderness.   Abdomen: Bowel sounds normal, Soft, No tenderness, No masses, No pulsatile masses. No peritoneal signs.  Skin: Warm, Dry, No erythema, No rash.   Extremities: Intact distal pulses, No edema, No cyanosis.  Musculoskeletal: Good range of motion in all major joints. No or major deformities noted.   Neurologic: Alert, cooperative, normal range of motion and strength in all extremities.  Cranial nerves intact with the exception of the patient not phonating, however he is making the shapes of all appropriate words with his lips, and receptive language is intact, and he is able to communicate fluidly by typing.  Psychiatric: Affect anxious, tearful judgment " "normal, Mood normal.     DIAGNOSTIC STUDIES / PROCEDURES    EKG:   Interpreted by me    Rhythm:  SINUS BRADYCARDIA  Rate: 57  No previous ECG available for comparison      RADIOLOGY  CT-HEAD W/O   Final Result      Normal CT scan of the head without contrast.               INTERPRETING LOCATION:  [facility], [address]        The radiologist's interpretation of all radiological studies have been reviewed by me.    COURSE & MEDICAL DECISION MAKING  Nursing notes, VS, PMSFHx reviewed in chart.     10:05 PM Patient seen and examined at bedside. Differential diagnosis includes but is not limited to anxiety/panic/conversion symptoms, much less likely true vocal cord paralysis or any true hemorrhagic or ischemic stroke.  The fact that the patient types out fluidly problems with his emotions of the past couple of days which he describes as \"holding back my feelings for the last 2 days,\" and is manifesting symptoms of being \"unable to speak\" seems to be a strong suggestion of conversion symptoms that match his psychic stress. Ordered for head CT to evaluate. Patient will be treated with Toradol, Compazine, Benadryl, and Ativan for his symptoms of headache and anxiety.    11:13 PM This patient's head CT is unremarkable.     11:42 PM I returned to the bedside, and the patient is now speaking.  He says he feels better.  He has no pain symptoms at this time.  He has no available to tell me that he has a lot of stress at work, and he and his wife had to euthanize their 13-year-old dog a couple of days ago, and all of this has been very stressful for him.  He has had to be discharged home, and grateful for our help feeling better.  He will follow-up with his primary care doctor as soon as possible.     The patient will return for new or worsening symptoms and is stable at the time of discharge.    The patient is referred to a primary physician for blood pressure management, diabetic screening, and for all other preventative " health concerns.    DISPOSITION:  Patient will be discharged home in stable condition.    FOLLOW UP:  KATHLEEN Land  [address]  [facility] [address]  [place] [id number] [phone]    Schedule an appointment as soon as possible for a visit       [facility], Emergency Dept  [address] [facility]  [place] [id number] [phone]    If symptoms worsen      OUTPATIENT MEDICATIONS:  Discharge Medication List as of 8/26/2019 12:17 AM            FINAL IMPRESSION  1. Acute nonintractable headache, unspecified headache type    2. Speech disturbance, unspecified type    3. Situational anxiety

## 2019-08-26 NOTE — ED NOTES
Discharge instructions given to pt, pt verbalized understanding. VSS. No prescriptions. All personal belongings accounted for. Pt ambulated safely. IV was removed.

## 2021-03-08 ENCOUNTER — OFFICE VISIT (OUTPATIENT)
Dept: URGENT CARE | Facility: CLINIC | Age: 46
End: 2021-03-08
Payer: COMMERCIAL

## 2021-03-08 VITALS
OXYGEN SATURATION: 98 % | DIASTOLIC BLOOD PRESSURE: 78 MMHG | WEIGHT: 209 LBS | BODY MASS INDEX: 31.67 KG/M2 | SYSTOLIC BLOOD PRESSURE: 122 MMHG | HEART RATE: 79 BPM | TEMPERATURE: 97.1 F | HEIGHT: 68 IN | RESPIRATION RATE: 14 BRPM

## 2021-03-08 DIAGNOSIS — S05.91XA RIGHT EYE INJURY, INITIAL ENCOUNTER: ICD-10-CM

## 2021-03-08 PROCEDURE — 99204 OFFICE O/P NEW MOD 45 MIN: CPT | Performed by: NURSE PRACTITIONER

## 2021-03-08 RX ORDER — POLYMYXIN B SULFATE AND TRIMETHOPRIM 1; 10000 MG/ML; [USP'U]/ML
1 SOLUTION OPHTHALMIC EVERY 4 HOURS
Qty: 10 ML | Refills: 0 | Status: SHIPPED | OUTPATIENT
Start: 2021-03-08 | End: 2021-03-15

## 2021-03-08 RX ORDER — QUETIAPINE FUMARATE 100 MG/1
150 TABLET, FILM COATED ORAL DAILY
COMMUNITY

## 2021-03-08 ASSESSMENT — VISUAL ACUITY
OS_CC: 20/20
OD_CC: 20/50

## 2021-03-08 NOTE — PROGRESS NOTES
"  Chief Complaint   Patient presents with   • Eye Injury     keyboard fell on his right eye yesterday, red and irritated       HISTORY OF PRESENT ILLNESS: Patient is a 45 y.o. male who presents to urgent care today with concerns of an eye injury. He was lifting a box overhead yesterday when a keyboard fell out of the box, falling onto his right eye. Since then he has had pain to the eye, erythema, visual changes (\"my vision is fanning\"), tearing, and photophobia. He wears glasses, not contacts.  He has not tried any medication for symptom relief.    Patient Active Problem List    Diagnosis Date Noted   • Obesity (BMI 30-39.9) 01/04/2018   • BMI 34.0-34.9,adult 09/23/2016   • Dyslipidemia 02/19/2016   • Bipolar 1 disorder (HCC) 01/07/2016   • Generalized anxiety disorder 01/07/2016       Allergies:Wellbutrin [bupropion hcl]; Antihistamines, loratadine-type [piperidines]; Latex; and Statins [hmg-coa-r inhibitors]    Current Outpatient Medications Ordered in Epic   Medication Sig Dispense Refill   • QUEtiapine (SEROQUEL) 100 MG Tab Take 150 mg by mouth 2 times a day.     • polymixin-trimethoprim (POLYTRIM) 91460-2.1 UNIT/ML-% Solution Administer 1 Drop into the right eye every 4 hours for 7 days. 10 mL 0   • cetirizine (ZYRTEC) 10 MG Tab Take 10 mg by mouth every day.     • lamotrigine (LAMICTAL) 100 MG TABS Take 200 mg by mouth 2 times a day.       • budesonide (RINOCORT AQUA) 32 MCG/ACT nasal spray Spray 2 Sprays in nose every day.  11   • alprazolam (XANAX XR) 2 MG XR tablet Take 1 Tab by mouth 2 Times a Day.     • Iloperidone (FANAPT) 8 MG Tab Take 8 mg by mouth 2 Times a Day. 60 Tab      No current Epic-ordered facility-administered medications on file.       Past Medical History:   Diagnosis Date   • Anesthesia     woke up w/asthma attack   • Arthritis     joints   • ASTHMA    • Bipolar 1 disorder (HCC)    • Dental disorder    • Family history of psychiatric condition     adhd, takes med for anger mgmt/depression " "  • Heart burn    • Pain        Social History     Tobacco Use   • Smoking status: Current Every Day Smoker     Packs/day: 0.50     Types: Cigarettes   • Smokeless tobacco: Current User   • Tobacco comment: -3/4 ppd   Substance Use Topics   • Alcohol use: No   • Drug use: No       Family Status   Relation Name Status   • Mo  Alive   • Fa       Family History   Problem Relation Age of Onset   • Diabetes Mother    • Hypertension Mother    • Heart Disease Father        ROS:  Review of Systems   Constitutional: Negative for fever, chills, weight loss, malaise, and fatigue.   HENT: Negative for ear pain, nosebleeds, congestion, sore throat and neck pain.    Eyes: Positive for vision changes, eye pain, erythema, tearing, photophobia.   Neuro: Negative for headache, sensory changes, weakness, seizure, LOC.   Cardiovascular: Negative for chest pain, palpitations, orthopnea and leg swelling.   Respiratory: Negative for cough, sputum production, shortness of breath and wheezing.   Gastrointestinal: Negative for abdominal pain, nausea, vomiting or diarrhea.   Genitourinary: Negative for dysuria, urgency and frequency.  Musculoskeletal: Negative for falls, neck pain, back pain, joint pain, myalgias.   Skin: Negative for rash, diaphoresis.     Exam:  /78 (BP Location: Left arm, Patient Position: Sitting, BP Cuff Size: Adult)   Pulse 79   Temp 36.2 °C (97.1 °F) (Temporal)   Resp 14   Ht 1.727 m (5' 8\")   Wt 94.8 kg (209 lb)   SpO2 98%   General: well-nourished, well-developed male in NAD  Head: normocephalic, atraumatic  Eyes: PERRLA, right medial conjunctival injection, lids normal.  There is some fluorescein uptake to medial aspect of right cornea, potential for abrasion?  No foreign bodies.  Acuity decreased right sided.  Ears: normal shape and symmetry, no tenderness, no discharge. External canals are without any significant edema or erythema. Tympanic membranes are without any inflammation, no " "effusion. Gross auditory acuity is intact.  Nose: symmetrical without tenderness, no discharge.  Mouth/Throat: reasonable hygiene, no erythema, exudates or tonsillar enlargement.  Neck: no masses, range of motion within normal limits, no tracheal deviation. No obvious thyroid enlargement.   Lymph: no cervical adenopathy. No supraclavicular adenopathy.   Neuro: alert and oriented. Cranial nerves 1-12 grossly intact. No sensory deficit.   Cardiovascular: regular rate and rhythm. No edema.  Pulmonary: no distress. Chest is symmetrical with respiration, no wheezes, crackles, or rhonchi.   Musculoskeletal: no clubbing, appropriate muscle tone, gait is stable.  Skin: warm, dry, intact, no clubbing, no cyanosis, no rashes.   Psych: appropriate mood, affect, judgement.         Assessment/Plan:  1. Right eye injury, initial encounter  polymixin-trimethoprim (POLYTRIM) 09755-5.1 UNIT/ML-% Solution         Patient presents with right eye injury.  Upon examination there is questionable uptake to medial aspect of eye consistent with an abrasion.  No signs of open globe.  I have offered the patient Polytrim with close follow-up from his eye doctor.  At that time he did admit to further changes in his vision with \"fanning\" patterns and he is concerned about this.  Due to the specific visual changes and mechanism of injury, I have arranged patient to have follow-up with his eye physician today, Dr. Ariza.  He will go straight to the clinic from here, for further eye evaluation.    Please note that this dictation was created using voice recognition software. I have made every reasonable attempt to correct obvious errors, but I expect that there are errors of grammar and possibly content that I did not discover before finalizing the note.      CHIARA Soliz.     "

## 2021-04-06 ENCOUNTER — OFFICE VISIT (OUTPATIENT)
Dept: NEUROLOGY | Facility: MEDICAL CENTER | Age: 46
End: 2021-04-06
Attending: PSYCHIATRY & NEUROLOGY
Payer: COMMERCIAL

## 2021-04-06 VITALS
SYSTOLIC BLOOD PRESSURE: 136 MMHG | WEIGHT: 209.44 LBS | OXYGEN SATURATION: 98 % | HEART RATE: 70 BPM | BODY MASS INDEX: 31.74 KG/M2 | HEIGHT: 68 IN | DIASTOLIC BLOOD PRESSURE: 82 MMHG | TEMPERATURE: 97.1 F

## 2021-04-06 DIAGNOSIS — F44.9 CONVERSION DISORDER: ICD-10-CM

## 2021-04-06 PROCEDURE — 99211 OFF/OP EST MAY X REQ PHY/QHP: CPT | Performed by: PSYCHIATRY & NEUROLOGY

## 2021-04-06 PROCEDURE — 99204 OFFICE O/P NEW MOD 45 MIN: CPT | Performed by: PSYCHIATRY & NEUROLOGY
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
## 2021-05-26 ENCOUNTER — TELEMEDICINE (OUTPATIENT)
Dept: NEUROLOGY | Facility: MEDICAL CENTER | Age: 46
End: 2021-05-26
Attending: PSYCHIATRY & NEUROLOGY
Payer: COMMERCIAL

## 2021-05-26 VITALS — DIASTOLIC BLOOD PRESSURE: 80 MMHG | WEIGHT: 205 LBS | SYSTOLIC BLOOD PRESSURE: 125 MMHG | BODY MASS INDEX: 31.17 KG/M2

## 2021-05-26 DIAGNOSIS — F44.9 CONVERSION DISORDER: ICD-10-CM

## 2021-05-26 PROCEDURE — 99212 OFFICE O/P EST SF 10 MIN: CPT | Mod: 95 | Performed by: PSYCHIATRY & NEUROLOGY

## 2021-05-26 RX ORDER — ALPRAZOLAM 1 MG/1
1 TABLET, EXTENDED RELEASE ORAL
COMMUNITY

## 2021-05-26 NOTE — PROGRESS NOTES
"[facility] NEUROLOGY  FOLLOW-UP VISIT    This evaluation was conducted via Zoom using secure and encrypted videoconferencing technology. The patient was in a private location in the [facility] [place].    The patient's identity was confirmed and verbal consent was obtained for this virtual visit.    CC: conversion disorder    INTERVAL HISTORY:  Moncho Mccall is a 45 y.o. man with likely conversion disorder as well as obesity, HLD, bipolar I, and generalized anxiety.  I last saw him in the clinic on 4/6/2021.  At that time I recommended EEG.  Today, I followed up with him via Zoom, and he provided the following interval history:    Since last visit Moncho has not experienced any additional episodes of \"aphasia.\"  He excepted the diagnosis of conversion disorder fairly well and adopted mindfulness meditation into his daily routine.  With these changes he has been able to reduce the dosage of alprazolam.  He now takes 1 mg daily on an as-needed basis.  Otherwise he seems to be doing well.  He got a promotion at work and is in the process of moving to a new home in [facility].  Otherwise, he has no questions or concerns.    MEDICATIONS:  Current Outpatient Medications   Medication Sig   • alprazolam (XANAX XR) 1 MG XR tablet Take 1 mg by mouth 1 time a day as needed.   • QUEtiapine (SEROQUEL) 100 MG Tab Take 150 mg by mouth every day.   • lamotrigine (LAMICTAL) 100 MG TABS Take 200 mg by mouth 2 times a day.       MEDICAL, SOCIAL, AND FAMILY HISTORY:  There is no change in the patient's ROS or PFSH from their previous visit on 4/6/2021.    REVIEW OF SYSTEMS:  A ROS was completed.  Pertinent positives and negatives were included in the HPI, above.  All other systems were reviewed and are negative.    PHYSICAL EXAM:  General/Medical:  - NAD  - hair, skin, nails, and joints were normal     Neuro:  MENTAL STATUS: awake and alert; no deficits of speech or language; oriented to person, place, and time; " affect was appropriate to situation; pleasant, cooperative    CRANIAL NERVES:    II: acuity was: NT; fields: NT; pupils: NT; discs: NT    III/IV/VI: versions intact without nystagmus    V: facial sensation symmetric to light touch    VII: facial expression symmetric    VIII: hearing intact to voice    IX/X: palate: NT    XI: shoulder shrug symmetric    XII: tongue midline     MOTOR:  - bulk: NT  - tone: NT  Upper Extremity Strength  (R/L)    NT   Elbow flexion NT   Elbow extension NT   Shoulder abduction NT      Lower Extremity Strength  (R/L)   Hip flexion NT   Knee extension NT   Knee flexion NT   Ankle plantarflexion NT   Ankle dorsiflexion NT      - no abnormal movements     SENSATION:  - light touch: grossly intact over the upper and lower extremities  - vibration (R/L, seconds): NT at the great toes  - pinprick: NT  - proprioception: NT  - Romberg: NT    COORDINATION:  - finger to nose was: NT  - finger tapping was: NT    REFLEXES:  Reflex Right Left   BR NT NT   Biceps NT NT   Triceps NT NT   Patellae NT NT   Achilles NT NT   Toes NT NT      GAIT:  - NT    REVIEW OF IMAGING STUDIES:  No new images since the last visit.    REVIEW OF LABORATORY STUDIES:  No new data since the last visit.    ASSESSMENT:  Moncho Mccall is a 45 y.o. man with conversion disorder.  He has done very well without additional episodes of speech difficulty with the incorporation of mindfulness practices.  At this point I don't recommend any additional workup.  At the last visit I recommended EEG, but given Moncho's recent improvement, I don't think this is necessary.  We will follow up on an as-needed basis.    PLAN:  Conversion Disorder:  - continue mindfulness practices  - no additional workup at this time    Follow-Up:  - Return if symptoms worsen or fail to improve.    Signed: Jorge Cardoso M.D. at 7:08 AM on 05/26/21

## 2025-04-18 ENCOUNTER — TELEPHONE (OUTPATIENT)
Dept: CARDIOLOGY | Facility: MEDICAL CENTER | Age: 50
End: 2025-04-18
Payer: COMMERCIAL

## 2025-04-18 NOTE — TELEPHONE ENCOUNTER
"Per BN, \"Can you see if this patient can see me on 5/12?\" LVM for pt to call back. Said to ask for Chely or Nancy. /debby 04/18/25  "

## 2025-05-28 ENCOUNTER — TELEPHONE (OUTPATIENT)
Dept: CARDIOLOGY | Facility: MEDICAL CENTER | Age: 50
End: 2025-05-28
Payer: COMMERCIAL

## 2025-05-28 NOTE — TELEPHONE ENCOUNTER
Chart prep:    Spoke with patient. Previous Cardiology is [facility].    Faxed STAT request     Fax# [phone]

## 2025-06-11 ENCOUNTER — TELEPHONE (OUTPATIENT)
Dept: CARDIOLOGY | Facility: MEDICAL CENTER | Age: 50
End: 2025-06-11
Payer: COMMERCIAL

## 2025-06-11 ENCOUNTER — OFFICE VISIT (OUTPATIENT)
Dept: CARDIOLOGY | Facility: MEDICAL CENTER | Age: 50
End: 2025-06-11
Attending: INTERNAL MEDICINE
Payer: COMMERCIAL

## 2025-06-11 VITALS
RESPIRATION RATE: 18 BRPM | BODY MASS INDEX: 35.31 KG/M2 | WEIGHT: 233 LBS | DIASTOLIC BLOOD PRESSURE: 74 MMHG | SYSTOLIC BLOOD PRESSURE: 108 MMHG | OXYGEN SATURATION: 97 % | HEART RATE: 68 BPM | HEIGHT: 68 IN

## 2025-06-11 DIAGNOSIS — R07.9 CHEST PAIN, UNSPECIFIED TYPE: Primary | ICD-10-CM

## 2025-06-11 DIAGNOSIS — I25.82 CHRONIC TOTAL OCCLUSION OF CORONARY ARTERY: ICD-10-CM

## 2025-06-11 DIAGNOSIS — I25.2 HISTORY OF INFERIOR WALL MYOCARDIAL INFARCTION: ICD-10-CM

## 2025-06-11 LAB — EKG IMPRESSION: NORMAL

## 2025-06-11 PROCEDURE — 93010 ELECTROCARDIOGRAM REPORT: CPT | Performed by: INTERNAL MEDICINE

## 2025-06-11 PROCEDURE — 3078F DIAST BP <80 MM HG: CPT | Performed by: INTERNAL MEDICINE

## 2025-06-11 PROCEDURE — 93005 ELECTROCARDIOGRAM TRACING: CPT | Mod: TC | Performed by: INTERNAL MEDICINE

## 2025-06-11 PROCEDURE — 3074F SYST BP LT 130 MM HG: CPT | Performed by: INTERNAL MEDICINE

## 2025-06-11 PROCEDURE — 99212 OFFICE O/P EST SF 10 MIN: CPT | Performed by: INTERNAL MEDICINE

## 2025-06-11 PROCEDURE — 99205 OFFICE O/P NEW HI 60 MIN: CPT | Performed by: INTERNAL MEDICINE

## 2025-06-11 RX ORDER — RANOLAZINE 500 MG/1
500 TABLET, EXTENDED RELEASE ORAL 2 TIMES DAILY
COMMUNITY

## 2025-06-11 RX ORDER — ASPIRIN 81 MG/1
81 TABLET, CHEWABLE ORAL DAILY
COMMUNITY
Start: 2025-03-20 | End: 2026-03-15

## 2025-06-11 RX ORDER — EZETIMIBE 10 MG/1
10 TABLET ORAL EVERY EVENING
COMMUNITY
Start: 2025-03-20 | End: 2026-03-15

## 2025-06-11 RX ORDER — NITROGLYCERIN 0.4 MG/1
0.4 TABLET SUBLINGUAL PRN
COMMUNITY
Start: 2025-02-18

## 2025-06-11 RX ORDER — LAMOTRIGINE 200 MG/1
200 TABLET ORAL NIGHTLY
COMMUNITY

## 2025-06-11 RX ORDER — ATORVASTATIN CALCIUM 80 MG/1
80 TABLET, FILM COATED ORAL EVERY EVENING
COMMUNITY

## 2025-06-11 RX ORDER — METOPROLOL SUCCINATE 25 MG/1
25 TABLET, EXTENDED RELEASE ORAL DAILY
COMMUNITY
Start: 2025-03-20

## 2025-06-11 RX ORDER — QUETIAPINE 300 MG/1
300 TABLET, FILM COATED, EXTENDED RELEASE ORAL NIGHTLY
COMMUNITY

## 2025-06-11 RX ORDER — TICAGRELOR 90 MG/1
90 TABLET, FILM COATED ORAL 2 TIMES DAILY
COMMUNITY
Start: 2025-03-20

## 2025-06-11 RX ORDER — ISOSORBIDE MONONITRATE 30 MG/1
30 TABLET, EXTENDED RELEASE ORAL EVERY MORNING
COMMUNITY
Start: 2025-03-20 | End: 2026-03-15

## 2025-06-11 RX ORDER — METOPROLOL TARTRATE 25 MG/1
25 TABLET, FILM COATED ORAL EVERY MORNING
Status: ON HOLD | COMMUNITY
End: 2025-06-26

## 2025-06-11 ASSESSMENT — FIBROSIS 4 INDEX: FIB4 SCORE: 1.025641025641025641

## 2025-06-11 NOTE — PROGRESS NOTES
INTENTIONAL CARDIOLOGY  CONSULTATION NOTE    Date of Visit: 6/12/2025    Referring Provider: Negin Shaver*    Patient Name: Moncho Mccall  YOB: 1975  MRN: [id number]     Reason for Visit:    consultation     Patient Story:   Moncho Mccall is a 49 year-old gentleman with a past medical history of bipolar disorder, type 1 diabetes, and coronary artery disease (s/p inferior STEMI on 2/2/2025).  Briefly, he suffered an inferior STEMI on 2/2/2025 that was successfully treated with extensive PCI with overlapping 3.5 x 38, 3.5 x 22, and 3.0 x 38 mm SHAHBAZ.  He was found at that time to have residual severe disease in the LCx into the OM1 and a  of the mid LAD.  He was seen by Dr. Lawrence for a CABG evaluation, and was not felt to have adequate targets for grafting.  He presents today to discuss possible complex percutaneous revascularization.    Currently, he reports having limiting shortness of breath at higher levels of activity.  He notices that if his heart rate is over 100 he gets a sensation of tightness/shortness of breath in his chest that resolves when he rests.  This symptom is uncomfortable for him and does limit his activity levels.     Medications and Allergies:   Current Medications[1]    Allergies[2]     Medical Decision Making:   I discussed the indications, risks, and benefits of possible  intervention with the patient and his wife.  He does have limiting symptoms on three anti-anginal agents and, given his young age, he is also likely to benefit from complete revascularization.  I reviewed the coronary angiogram images provided by Dr. Shaver, which demonstrated a mid LAD  that does appear amenable to percutaneous revascularization.  Although I do not have the patient's echocardiogram images, his report does not indicate thinning/akinesis of the anterior wall consistent with nonviability.    Given that he is having limiting symptoms on  "multiple antianginal agents and he appears to have a reasonable  target with anterior wall viability, I recommended proceeding with an attempt at  intervention.  I discussed that  intervention is a complex procedure that has higher risks of both failure and serious and/or life-threatening complications.  In particular, there is an increased risk of coronary artery perforation possibly resulting in cardiac tamponade and death.  Following this discussion, the patient wished to proceed with an attempt at  intervention.    Follow Up  Will schedule for  intervention.     Cardiac Studies and Procedures:   Echocardiography  TTE (6/6/2025)  The left ventricle is normal in size with a low-normal ejection fraction of 50-55% by Hampton's biplane with regional wall motion abnormalities noted. There is severe hypokinesis of the basal septum and basal inferior with additional hypokinesis of the base to mid inferolateral and anterolateral walls. All remaining wall segments are normal.   No significant valvular abnormalities noted.     Coronary Angiography/Cardiac Catheterization  CAG (2/2/2025)  Acute thrombotic occlusion of the distal RCA and PL branch.   Chronic total occlusion of the mid LAD   Severe disease of the LCX into OM1   Successful IVUS guided percutaneous coronary intervention of the mid to with a 3.0x38, 3.5x22 and 3.5x38 mm overlapping SHAHBAZ    Electrophysiology  ECG (6/11/2025)  Sinus bradycardia, prior inferior infarct with posterior extension     Vital Signs:   /74 (BP Location: Left arm, Patient Position: Sitting)   Pulse 68   Resp 18   Ht 1.727 m (5' 8\")   Wt 106 kg (233 lb)   SpO2 97%    BP Readings from Last 4 Encounters:   06/11/25 108/74   05/26/21 125/80   04/06/21 136/82   03/08/21 122/78     Wt Readings from Last 4 Encounters:   06/11/25 106 kg (233 lb)   04/24/23 104 kg (230 lb)   05/26/21 93 kg (205 lb)   04/06/21 95 kg (209 lb 7 oz)     Body mass index is 35.43 kg/m².     " "Laboratories:   Lipids  Lab Results   Component Value Date/Time     (H) 01/16/2016 07:50 AM       Lab Results   Component Value Date/Time    HDL 45 01/16/2016 07:50 AM       Lab Results   Component Value Date/Time    TRIGLYCERIDE 189 (H) 01/16/2016 07:50 AM       Lab Results   Component Value Date/Time    CHOLSTRLTOT 245 (H) 01/16/2016 07:50 AM       No components found for: \"LPA\"      Chemistries  Lab Results   Component Value Date/Time    CREATININE 0.90 06/07/2025 12:14 AM    CREATININE 1.00 06/06/2025 11:31 AM    CREATININE 1.10 06/03/2025 09:25 AM    CREATININE 1.10 02/04/2025 03:03 AM    CREATININE 1.10 02/03/2025 03:44 AM    CREATININE 1.02 09/15/2018 02:37 PM    CREATININE 1.10 01/16/2016 07:50 AM    CREATININE 1.05 04/18/2010 11:03 AM    CREATININE 0.9 10/10/2006 06:17 PM    CREATININE 1.0 10/07/2006 11:35 PM     Lab Results   Component Value Date/Time    BUN 15.0 06/07/2025 12:14 AM    BUN 17.0 06/06/2025 11:31 AM    BUN 17.0 06/03/2025 09:25 AM    BUN 15.0 02/04/2025 03:03 AM    BUN 17.0 02/03/2025 03:44 AM    BUN 15 09/15/2018 02:37 PM    BUN 20 01/16/2016 07:50 AM    BUN 9 04/18/2010 11:03 AM    BUN 8 10/10/2006 06:17 PM    BUN 11 10/07/2006 11:35 PM     Lab Results   Component Value Date/Time    POTASSIUM 4.0 06/07/2025 12:14 AM    POTASSIUM 3.8 06/06/2025 11:31 AM    POTASSIUM 3.6 06/03/2025 09:25 AM    POTASSIUM 4.5 09/15/2018 02:37 PM    POTASSIUM 4.2 01/16/2016 07:50 AM    POTASSIUM 3.9 04/18/2010 11:03 AM    POTASSIUM 3.6 10/10/2006 06:17 PM     Lab Results   Component Value Date/Time    SODIUM 139 06/07/2025 12:14 AM    SODIUM 141 06/06/2025 11:31 AM    SODIUM 140 06/03/2025 09:25 AM    SODIUM 136 09/15/2018 02:37 PM    SODIUM 143 01/16/2016 07:50 AM    SODIUM 138 04/18/2010 11:03 AM    SODIUM 135 10/10/2006 06:17 PM     Lab Results   Component Value Date/Time    GLUCOSE 101 06/07/2025 12:14 AM    GLUCOSE 121 06/06/2025 11:31 AM    GLUCOSE 120 06/03/2025 09:25 AM    GLUCOSE 81 09/15/2018 " "02:37 PM    GLUCOSE 88 01/16/2016 07:50 AM    GLUCOSE 95 04/18/2010 11:03 AM    GLUCOSE 98 10/10/2006 06:17 PM     Lab Results   Component Value Date/Time    ASTSGOT 20 06/07/2025 12:14 AM    ASTSGOT 63 (H) 02/04/2025 03:03 AM    ASTSGOT 25 09/15/2018 02:37 PM    ASTSGOT 23 01/16/2016 07:50 AM    ASTSGOT 23 04/18/2010 11:03 AM    ASTSGOT 23 10/07/2006 11:35 PM     Lab Results   Component Value Date/Time    ALTSGPT 25 06/07/2025 12:14 AM    ALTSGPT 28 02/04/2025 03:03 AM    ALTSGPT 21 09/15/2018 02:37 PM    ALTSGPT 17 01/16/2016 07:50 AM    ALTSGPT 25 04/18/2010 11:03 AM    ALTSGPT 20 10/07/2006 11:35 PM     Lab Results   Component Value Date/Time    ALKPHOSPHAT 75 06/07/2025 12:14 AM    ALKPHOSPHAT 76 02/04/2025 03:03 AM    ALKPHOSPHAT 121 (H) 09/15/2018 02:37 PM    ALKPHOSPHAT 59 01/16/2016 07:50 AM    ALKPHOSPHAT 95 04/18/2010 11:03 AM    ALKPHOSPHAT 73 10/07/2006 11:35 PM     Lab Results   Component Value Date/Time    HBA1C 5.1 06/07/2025 12:14 AM    HBA1C 5.2 02/02/2025 07:20 AM     Lab Results   Component Value Date/Time    TSH 0.919 01/16/2016 07:50 AM     No results found for: \"NTPROBNP\"  No results found for: \"TROPONINT\"    Blood Counts  Lab Results   Component Value Date/Time    HEMOGLOBIN 14.7 06/07/2025 12:14 AM    HEMOGLOBIN 14.3 06/06/2025 11:31 AM    HEMOGLOBIN 15.2 06/03/2025 09:25 AM    HEMOGLOBIN 12.9 (L) 09/15/2018 02:37 PM    HEMOGLOBIN 16.1 04/18/2010 11:03 AM    HEMOGLOBIN 15.9 10/10/2006 06:17 PM     Lab Results   Component Value Date/Time    PLATELETCT 195 06/07/2025 12:14 AM    PLATELETCT 205 06/06/2025 11:31 AM    PLATELETCT 197 06/03/2025 09:25 AM    PLATELETCT 233 09/15/2018 02:37 PM    PLATELETCT 220 04/18/2010 11:03 AM    PLATELETCT 240 10/10/2006 06:17 PM     Lab Results   Component Value Date/Time    WBC 9.60 06/07/2025 12:14 AM    WBC 9.40 06/06/2025 11:31 AM    WBC 9.80 06/03/2025 09:25 AM    WBC 10.3 09/15/2018 02:37 PM    WBC 9.9 04/18/2010 11:03 AM    WBC 9.1 10/10/2006 06:17 PM "      Physical Examination:   General: Well-appearing, no acute distress  Eyes: Extraocular movements intact, anicteric  Neck: Full range of motion, no gross jugular venous distension  Pulmonary: Normal respiratory effort, no distress  Cardiovascular: Bradycardic rate, regular rhythm  Gastrointestinal: Mildly obese, on-distended  Extremities: Moves all extremities normally, no gross lower extremity edema  Neurological: Alert and oriented, normal speech  Psychiatric: Slightly flat affect, normal judgment     Past History:   Past Medical History  The patient's past medical history was reviewed.  See HPI and self-reported patient medical history form for pertinent medical history to consultation.    Past Social History  The patient's social history was reviewed.  See [facility] self-reported patient medical history form for pertinent social history to consultation.    Past Family History  The patient's family history was reviewed.  See [facility] self-reported patient medical history form for pertinent family history to consultation.    Review of Systems  A pertinent cardiac review of systems was performed and was otherwise unremarkable except as per HPI and self-reported patient medical history form.        Matthew Diaz MD, [facility]  Interventional Cardiology  [facility], [facility]  [address] [place] [id number]  Phone: [phone]  Fax: [phone]                 [1]   Current Outpatient Medications   Medication Sig Dispense Refill    aspirin (ASA) 81 MG Chew Tab chewable tablet Chew 81 mg every day.      atorvastatin (LIPITOR) 80 MG tablet Take 80 mg by mouth every evening.      ezetimibe (ZETIA) 10 MG Tab Take 10 mg by mouth every day.      isosorbide mononitrate SR (IMDUR) 30 MG TABLET SR 24 HR Take 30 mg by mouth every day.      metoprolol tartrate (LOPRESSOR) 25 MG Tab Take 25 mg by mouth every day.      nitroglycerin (NITROSTAT) 0.4 MG SL Tab  Place 0.4 mg under the tongue as needed for Chest Pain.      ranolazine (RANEXA) 500 MG TABLET SR 12 HR Take 500 mg by mouth 2 times a day.      ticagrelor (BRILINTA) 90 MG Tab tablet Take 90 mg by mouth 2 times a day.      lamotrigine (LAMICTAL) 200 MG tablet Take 200 mg by mouth every morning.      quetiapine (SEROQUEL XR) 300 MG XR tablet Take 300 mg by mouth every evening.      alprazolam (XANAX XR) 1 MG XR tablet Take 1 mg by mouth 1 time a day as needed. (Patient taking differently: Take 1 mg by mouth every morning.)      metoprolol SR (TOPROL XL) 25 MG TABLET SR 24 HR Take 25 mg by mouth every day. (Patient not taking: Reported on 6/11/2025)      lamotrigine (LAMICTAL) 100 MG TABS Take 200 mg by mouth 2 times a day.   (Patient not taking: Reported on 6/11/2025)       No current facility-administered medications for this visit.   [2]   Allergies  Allergen Reactions    Wellbutrin [Bupropion Hcl] Anaphylaxis    Antihistamines, Loratadine-Type [Piperidines]     Peanut Allergen Powder-Dnfp     Statins [Hmg-Coa-R Inhibitors]

## 2025-06-12 PROBLEM — I25.2 HISTORY OF INFERIOR WALL MYOCARDIAL INFARCTION: Status: ACTIVE | Noted: 2025-06-12

## 2025-06-12 PROBLEM — I25.82 CHRONIC TOTAL OCCLUSION OF CORONARY ARTERY: Status: ACTIVE | Noted: 2025-06-12

## 2025-06-16 ENCOUNTER — TELEPHONE (OUTPATIENT)
Dept: CARDIOLOGY | Facility: MEDICAL CENTER | Age: 50
End: 2025-06-16
Payer: COMMERCIAL

## 2025-06-16 NOTE — TELEPHONE ENCOUNTER
ADRIANA    Caller: Moncho Mccall    Topic/issue: Patient called because during his last appointment Dr. Diaz has discussed getting a procedure done. He was following up about scheduling.     Please advise.     Callback Number: [phone]    Thank you,     Vilma OVALLE

## 2025-06-17 ENCOUNTER — TELEPHONE (OUTPATIENT)
Dept: CARDIOLOGY | Facility: MEDICAL CENTER | Age: 50
End: 2025-06-17
Payer: COMMERCIAL

## 2025-06-17 NOTE — TELEPHONE ENCOUNTER
Patient is scheduled on 6-26-25 for a  w[place]est[place] with  [place] [facility] No meds to stop and patient to check in at 5:30 AM for a 7:30 procedure. H&P was done on 6-11-25 by . Pre admit to call patient.

## 2025-06-18 NOTE — TELEPHONE ENCOUNTER
Yes, he can we just have to send a pre-auth. He was already pre-auth to see me in clinic for this exact reason. No one else in [place] offers the service.

## 2025-06-18 NOTE — TELEPHONE ENCOUNTER
This patient can't come to [facility] for  due to Insurance is OON. Patient will need to be referred out.

## 2025-06-24 ENCOUNTER — HOSPITAL ENCOUNTER (OUTPATIENT)
Dept: RADIOLOGY | Facility: MEDICAL CENTER | Age: 50
End: 2025-06-24
Payer: COMMERCIAL

## 2025-06-25 ENCOUNTER — PRE-ADMISSION TESTING (OUTPATIENT)
Dept: ADMISSIONS | Facility: MEDICAL CENTER | Age: 50
End: 2025-06-25
Payer: COMMERCIAL

## 2025-06-25 ENCOUNTER — APPOINTMENT (OUTPATIENT)
Dept: ADMISSIONS | Facility: MEDICAL CENTER | Age: 50
End: 2025-06-25
Attending: INTERNAL MEDICINE
Payer: COMMERCIAL

## 2025-06-25 RX ORDER — ACETAMINOPHEN 500 MG
500-1000 TABLET ORAL EVERY 6 HOURS PRN
COMMUNITY

## 2025-06-25 RX ORDER — IBUPROFEN 200 MG
200 TABLET ORAL EVERY 6 HOURS PRN
COMMUNITY

## 2025-06-26 ENCOUNTER — ANESTHESIA EVENT (OUTPATIENT)
Dept: CARDIOLOGY | Facility: MEDICAL CENTER | Age: 50
End: 2025-06-26
Payer: COMMERCIAL

## 2025-06-26 ENCOUNTER — HOSPITAL ENCOUNTER (OUTPATIENT)
Facility: MEDICAL CENTER | Age: 50
End: 2025-06-26
Attending: INTERNAL MEDICINE | Admitting: INTERNAL MEDICINE
Payer: COMMERCIAL

## 2025-06-26 ENCOUNTER — APPOINTMENT (OUTPATIENT)
Dept: CARDIOLOGY | Facility: MEDICAL CENTER | Age: 50
End: 2025-06-26
Attending: INTERNAL MEDICINE
Payer: COMMERCIAL

## 2025-06-26 ENCOUNTER — ANESTHESIA (OUTPATIENT)
Dept: CARDIOLOGY | Facility: MEDICAL CENTER | Age: 50
End: 2025-06-26
Payer: COMMERCIAL

## 2025-06-26 VITALS
RESPIRATION RATE: 16 BRPM | DIASTOLIC BLOOD PRESSURE: 75 MMHG | OXYGEN SATURATION: 96 % | HEART RATE: 61 BPM | WEIGHT: 233.03 LBS | SYSTOLIC BLOOD PRESSURE: 127 MMHG | BODY MASS INDEX: 35.32 KG/M2 | TEMPERATURE: 97.1 F | HEIGHT: 68 IN

## 2025-06-26 DIAGNOSIS — R07.9 CHEST PAIN, UNSPECIFIED TYPE: ICD-10-CM

## 2025-06-26 DIAGNOSIS — I25.82 CHRONIC TOTAL OCCLUSION OF CORONARY ARTERY: ICD-10-CM

## 2025-06-26 LAB
ACT BLD: 256 SEC (ref 74–137)
ACT BLD: 256 SEC (ref 74–137)
ACT BLD: 262 SEC (ref 74–137)
ACT BLD: 291 SEC (ref 74–137)
ALBUMIN SERPL BCP-MCNC: 4 G/DL (ref 3.2–4.9)
ALBUMIN/GLOB SERPL: 1.7 G/DL
ALP SERPL-CCNC: 79 U/L (ref 30–99)
ALT SERPL-CCNC: 31 U/L (ref 2–50)
ANION GAP SERPL CALC-SCNC: 10 MMOL/L (ref 7–16)
APTT PPP: 25.7 SEC (ref 24.7–36)
AST SERPL-CCNC: 26 U/L (ref 12–45)
BILIRUB SERPL-MCNC: 0.5 MG/DL (ref 0.1–1.5)
BUN SERPL-MCNC: 17 MG/DL (ref 8–22)
CALCIUM ALBUM COR SERPL-MCNC: 9 MG/DL (ref 8.5–10.5)
CALCIUM SERPL-MCNC: 9 MG/DL (ref 8.5–10.5)
CHLORIDE SERPL-SCNC: 108 MMOL/L (ref 96–112)
CO2 SERPL-SCNC: 21 MMOL/L (ref 20–33)
CREAT SERPL-MCNC: 1.03 MG/DL (ref 0.5–1.4)
EKG IMPRESSION: NORMAL
EKG IMPRESSION: NORMAL
ERYTHROCYTE [DISTWIDTH] IN BLOOD BY AUTOMATED COUNT: 38.8 FL (ref 35.9–50)
GFR SERPLBLD CREATININE-BSD FMLA CKD-EPI: 88 ML/MIN/1.73 M 2
GLOBULIN SER CALC-MCNC: 2.4 G/DL (ref 1.9–3.5)
GLUCOSE SERPL-MCNC: 110 MG/DL (ref 65–99)
HCT VFR BLD AUTO: 42 % (ref 42–52)
HGB BLD-MCNC: 14.4 G/DL (ref 14–18)
INR PPP: 0.99 (ref 0.87–1.13)
MCH RBC QN AUTO: 32 PG (ref 27–33)
MCHC RBC AUTO-ENTMCNC: 34.3 G/DL (ref 32.3–36.5)
MCV RBC AUTO: 93.3 FL (ref 81.4–97.8)
PLATELET # BLD AUTO: 194 K/UL (ref 164–446)
PMV BLD AUTO: 8.7 FL (ref 9–12.9)
POTASSIUM SERPL-SCNC: 4.2 MMOL/L (ref 3.6–5.5)
PROT SERPL-MCNC: 6.4 G/DL (ref 6–8.2)
PROTHROMBIN TIME: 13.1 SEC (ref 12–14.6)
RBC # BLD AUTO: 4.5 M/UL (ref 4.7–6.1)
SODIUM SERPL-SCNC: 139 MMOL/L (ref 135–145)
WBC # BLD AUTO: 9.1 K/UL (ref 4.8–10.8)

## 2025-06-26 PROCEDURE — 92972 PERQ TRLUML CORONRY LITHOTRP: CPT | Performed by: INTERNAL MEDICINE

## 2025-06-26 PROCEDURE — 160046 HCHG PACU - 1ST 60 MINS PHASE II

## 2025-06-26 PROCEDURE — 92978 ENDOLUMINL IVUS OCT C 1ST: CPT | Mod: LD

## 2025-06-26 PROCEDURE — 85610 PROTHROMBIN TIME: CPT

## 2025-06-26 PROCEDURE — 700111 HCHG RX REV CODE 636 W/ 250 OVERRIDE (IP): Mod: JZ | Performed by: ANESTHESIOLOGY

## 2025-06-26 PROCEDURE — 700101 HCHG RX REV CODE 250: Performed by: ANESTHESIOLOGY

## 2025-06-26 PROCEDURE — 160193 HCHG PACU STANDARD - 1ST 60 MINS

## 2025-06-26 PROCEDURE — 160015 HCHG STAT PREOP MINUTES

## 2025-06-26 PROCEDURE — 700111 HCHG RX REV CODE 636 W/ 250 OVERRIDE (IP): Mod: JZ

## 2025-06-26 PROCEDURE — 93010 ELECTROCARDIOGRAM REPORT: CPT | Mod: 76 | Performed by: STUDENT IN AN ORGANIZED HEALTH CARE EDUCATION/TRAINING PROGRAM

## 2025-06-26 PROCEDURE — 700105 HCHG RX REV CODE 258

## 2025-06-26 PROCEDURE — A9270 NON-COVERED ITEM OR SERVICE: HCPCS | Performed by: ANESTHESIOLOGY

## 2025-06-26 PROCEDURE — 85027 COMPLETE CBC AUTOMATED: CPT

## 2025-06-26 PROCEDURE — 93005 ELECTROCARDIOGRAM TRACING: CPT | Mod: TC | Performed by: INTERNAL MEDICINE

## 2025-06-26 PROCEDURE — 92943 PRQ TRLUML REVSC CH OCC ANT: CPT | Mod: LD | Performed by: INTERNAL MEDICINE

## 2025-06-26 PROCEDURE — 93010 ELECTROCARDIOGRAM REPORT: CPT | Performed by: STUDENT IN AN ORGANIZED HEALTH CARE EDUCATION/TRAINING PROGRAM

## 2025-06-26 PROCEDURE — 85730 THROMBOPLASTIN TIME PARTIAL: CPT

## 2025-06-26 PROCEDURE — 700117 HCHG RX CONTRAST REV CODE 255: Performed by: INTERNAL MEDICINE

## 2025-06-26 PROCEDURE — 700105 HCHG RX REV CODE 258: Performed by: ANESTHESIOLOGY

## 2025-06-26 PROCEDURE — 92929 PR PRQ TRLUML CORONARY STENT W/ANGIO ADDL ART/BRNCH: CPT | Mod: LD | Performed by: INTERNAL MEDICINE

## 2025-06-26 PROCEDURE — 160047 HCHG PACU  - EA ADDL 30 MINS PHASE II

## 2025-06-26 PROCEDURE — 700102 HCHG RX REV CODE 250 W/ 637 OVERRIDE(OP): Performed by: ANESTHESIOLOGY

## 2025-06-26 PROCEDURE — 160002 HCHG RECOVERY MINUTES (STAT)

## 2025-06-26 PROCEDURE — 85347 COAGULATION TIME ACTIVATED: CPT | Mod: 91

## 2025-06-26 PROCEDURE — 92978 ENDOLUMINL IVUS OCT C 1ST: CPT | Mod: 26,LD | Performed by: INTERNAL MEDICINE

## 2025-06-26 PROCEDURE — 700101 HCHG RX REV CODE 250

## 2025-06-26 PROCEDURE — 80053 COMPREHEN METABOLIC PANEL: CPT

## 2025-06-26 PROCEDURE — 93458 L HRT ARTERY/VENTRICLE ANGIO: CPT | Mod: 26,59 | Performed by: INTERNAL MEDICINE

## 2025-06-26 RX ORDER — LIDOCAINE HYDROCHLORIDE 20 MG/ML
INJECTION, SOLUTION EPIDURAL; INFILTRATION; INTRACAUDAL; PERINEURAL PRN
Status: DISCONTINUED | OUTPATIENT
Start: 2025-06-26 | End: 2025-06-26 | Stop reason: SURG

## 2025-06-26 RX ORDER — MIDAZOLAM HYDROCHLORIDE 1 MG/ML
INJECTION INTRAMUSCULAR; INTRAVENOUS
Status: COMPLETED
Start: 2025-06-26 | End: 2025-06-26

## 2025-06-26 RX ORDER — VERAPAMIL HYDROCHLORIDE 2.5 MG/ML
INJECTION INTRAVENOUS
Status: COMPLETED
Start: 2025-06-26 | End: 2025-06-26

## 2025-06-26 RX ORDER — HYDRALAZINE HYDROCHLORIDE 20 MG/ML
5 INJECTION INTRAMUSCULAR; INTRAVENOUS
Status: DISCONTINUED | OUTPATIENT
Start: 2025-06-26 | End: 2025-06-26 | Stop reason: HOSPADM

## 2025-06-26 RX ORDER — HYDROMORPHONE HYDROCHLORIDE 1 MG/ML
0.1 INJECTION, SOLUTION INTRAMUSCULAR; INTRAVENOUS; SUBCUTANEOUS
Status: DISCONTINUED | OUTPATIENT
Start: 2025-06-26 | End: 2025-06-26 | Stop reason: HOSPADM

## 2025-06-26 RX ORDER — ETOMIDATE 2 MG/ML
INJECTION INTRAVENOUS PRN
Status: DISCONTINUED | OUTPATIENT
Start: 2025-06-26 | End: 2025-06-26 | Stop reason: SURG

## 2025-06-26 RX ORDER — DEXAMETHASONE SODIUM PHOSPHATE 4 MG/ML
INJECTION, SOLUTION INTRA-ARTICULAR; INTRALESIONAL; INTRAMUSCULAR; INTRAVENOUS; SOFT TISSUE PRN
Status: DISCONTINUED | OUTPATIENT
Start: 2025-06-26 | End: 2025-06-26 | Stop reason: SURG

## 2025-06-26 RX ORDER — CALCIUM CHLORIDE 100 MG/ML
INJECTION INTRAVENOUS; INTRAVENTRICULAR PRN
Status: DISCONTINUED | OUTPATIENT
Start: 2025-06-26 | End: 2025-06-26 | Stop reason: SURG

## 2025-06-26 RX ORDER — NOREPINEPHRINE BITARTRATE 0.03 MG/ML
INJECTION, SOLUTION INTRAVENOUS
Status: COMPLETED
Start: 2025-06-26 | End: 2025-06-26

## 2025-06-26 RX ORDER — HEPARIN SODIUM 1000 [USP'U]/ML
INJECTION, SOLUTION INTRAVENOUS; SUBCUTANEOUS
Status: COMPLETED
Start: 2025-06-26 | End: 2025-06-26

## 2025-06-26 RX ORDER — MIDAZOLAM HYDROCHLORIDE 1 MG/ML
INJECTION INTRAMUSCULAR; INTRAVENOUS PRN
Status: DISCONTINUED | OUTPATIENT
Start: 2025-06-26 | End: 2025-06-26 | Stop reason: SURG

## 2025-06-26 RX ORDER — HEPARIN SODIUM 200 [USP'U]/100ML
INJECTION, SOLUTION INTRAVENOUS
Status: COMPLETED
Start: 2025-06-26 | End: 2025-06-26

## 2025-06-26 RX ORDER — CALCIUM CHLORIDE 100 MG/ML
INJECTION INTRAVENOUS; INTRAVENTRICULAR
Status: COMPLETED
Start: 2025-06-26 | End: 2025-06-26

## 2025-06-26 RX ORDER — LIDOCAINE HYDROCHLORIDE 40 MG/ML
SOLUTION TOPICAL
Status: COMPLETED
Start: 2025-06-26 | End: 2025-06-26

## 2025-06-26 RX ORDER — ONDANSETRON 2 MG/ML
INJECTION INTRAMUSCULAR; INTRAVENOUS PRN
Status: DISCONTINUED | OUTPATIENT
Start: 2025-06-26 | End: 2025-06-26 | Stop reason: SURG

## 2025-06-26 RX ORDER — LIDOCAINE HYDROCHLORIDE 40 MG/ML
SOLUTION TOPICAL PRN
Status: DISCONTINUED | OUTPATIENT
Start: 2025-06-26 | End: 2025-06-26 | Stop reason: SURG

## 2025-06-26 RX ORDER — MEPERIDINE HYDROCHLORIDE 25 MG/ML
12.5 INJECTION INTRAMUSCULAR; INTRAVENOUS; SUBCUTANEOUS
Status: DISCONTINUED | OUTPATIENT
Start: 2025-06-26 | End: 2025-06-26 | Stop reason: HOSPADM

## 2025-06-26 RX ORDER — OXYCODONE HCL 5 MG/5 ML
5 SOLUTION, ORAL ORAL
Status: COMPLETED | OUTPATIENT
Start: 2025-06-26 | End: 2025-06-26

## 2025-06-26 RX ORDER — HYDROMORPHONE HYDROCHLORIDE 1 MG/ML
0.2 INJECTION, SOLUTION INTRAMUSCULAR; INTRAVENOUS; SUBCUTANEOUS
Status: DISCONTINUED | OUTPATIENT
Start: 2025-06-26 | End: 2025-06-26 | Stop reason: HOSPADM

## 2025-06-26 RX ORDER — LIDOCAINE HYDROCHLORIDE 20 MG/ML
INJECTION, SOLUTION INFILTRATION; PERINEURAL
Status: COMPLETED
Start: 2025-06-26 | End: 2025-06-26

## 2025-06-26 RX ORDER — ALBUTEROL SULFATE 5 MG/ML
2.5 SOLUTION RESPIRATORY (INHALATION)
Status: DISCONTINUED | OUTPATIENT
Start: 2025-06-26 | End: 2025-06-26 | Stop reason: HOSPADM

## 2025-06-26 RX ORDER — HYDROMORPHONE HYDROCHLORIDE 1 MG/ML
0.4 INJECTION, SOLUTION INTRAMUSCULAR; INTRAVENOUS; SUBCUTANEOUS
Status: DISCONTINUED | OUTPATIENT
Start: 2025-06-26 | End: 2025-06-26 | Stop reason: HOSPADM

## 2025-06-26 RX ORDER — SODIUM CHLORIDE, SODIUM LACTATE, POTASSIUM CHLORIDE, CALCIUM CHLORIDE 600; 310; 30; 20 MG/100ML; MG/100ML; MG/100ML; MG/100ML
INJECTION, SOLUTION INTRAVENOUS
Status: DISCONTINUED | OUTPATIENT
Start: 2025-06-26 | End: 2025-06-26 | Stop reason: SURG

## 2025-06-26 RX ORDER — OXYCODONE HCL 5 MG/5 ML
10 SOLUTION, ORAL ORAL
Status: COMPLETED | OUTPATIENT
Start: 2025-06-26 | End: 2025-06-26

## 2025-06-26 RX ORDER — SODIUM CHLORIDE, SODIUM LACTATE, POTASSIUM CHLORIDE, CALCIUM CHLORIDE 600; 310; 30; 20 MG/100ML; MG/100ML; MG/100ML; MG/100ML
INJECTION, SOLUTION INTRAVENOUS CONTINUOUS
Status: DISCONTINUED | OUTPATIENT
Start: 2025-06-26 | End: 2025-06-26 | Stop reason: HOSPADM

## 2025-06-26 RX ORDER — HALOPERIDOL 5 MG/ML
1 INJECTION INTRAMUSCULAR
Status: DISCONTINUED | OUTPATIENT
Start: 2025-06-26 | End: 2025-06-26 | Stop reason: HOSPADM

## 2025-06-26 RX ADMIN — VERAPAMIL HYDROCHLORIDE 2.5 MG: 2.5 INJECTION, SOLUTION INTRAVENOUS at 08:32

## 2025-06-26 RX ADMIN — FENTANYL CITRATE 100 MCG: 50 INJECTION, SOLUTION INTRAMUSCULAR; INTRAVENOUS at 11:05

## 2025-06-26 RX ADMIN — CALCIUM CHLORIDE 200 MG: 100 INJECTION, SOLUTION INTRAVENOUS; INTRAVENTRICULAR at 09:16

## 2025-06-26 RX ADMIN — ROCURONIUM BROMIDE 50 MG: 10 INJECTION INTRAVENOUS at 08:53

## 2025-06-26 RX ADMIN — FENTANYL CITRATE 25 MCG: 50 INJECTION, SOLUTION INTRAMUSCULAR; INTRAVENOUS at 13:20

## 2025-06-26 RX ADMIN — NITROGLYCERIN 10 ML: 20 INJECTION INTRAVENOUS at 08:32

## 2025-06-26 RX ADMIN — HEPARIN SODIUM 2000 UNITS: 200 INJECTION, SOLUTION INTRAVENOUS at 10:00

## 2025-06-26 RX ADMIN — SODIUM CHLORIDE, POTASSIUM CHLORIDE, SODIUM LACTATE AND CALCIUM CHLORIDE: 600; 310; 30; 20 INJECTION, SOLUTION INTRAVENOUS at 12:00

## 2025-06-26 RX ADMIN — HEPARIN SODIUM: 1000 INJECTION, SOLUTION INTRAVENOUS; SUBCUTANEOUS at 08:32

## 2025-06-26 RX ADMIN — CALCIUM CHLORIDE 200 MG: 100 INJECTION, SOLUTION INTRAVENOUS; INTRAVENTRICULAR at 09:07

## 2025-06-26 RX ADMIN — ROCURONIUM BROMIDE 50 MG: 10 INJECTION INTRAVENOUS at 08:20

## 2025-06-26 RX ADMIN — CALCIUM CHLORIDE 200 MG: 100 INJECTION, SOLUTION INTRAVENOUS; INTRAVENTRICULAR at 08:53

## 2025-06-26 RX ADMIN — DEXAMETHASONE SODIUM PHOSPHATE 8 MG: 4 INJECTION INTRA-ARTICULAR; INTRALESIONAL; INTRAMUSCULAR; INTRAVENOUS; SOFT TISSUE at 08:20

## 2025-06-26 RX ADMIN — LIDOCAINE HYDROCHLORIDE: 20 INJECTION, SOLUTION INFILTRATION; PERINEURAL at 08:31

## 2025-06-26 RX ADMIN — LIDOCAINE HYDROCHLORIDE 100 MG: 20 INJECTION, SOLUTION EPIDURAL; INFILTRATION; INTRACAUDAL; PERINEURAL at 08:20

## 2025-06-26 RX ADMIN — PROPOFOL 100 MG: 10 INJECTION, EMULSION INTRAVENOUS at 08:20

## 2025-06-26 RX ADMIN — HEPARIN SODIUM 2000 UNITS: 200 INJECTION, SOLUTION INTRAVENOUS at 08:32

## 2025-06-26 RX ADMIN — CALCIUM CHLORIDE 200 MG: 100 INJECTION, SOLUTION INTRAVENOUS; INTRAVENTRICULAR at 08:43

## 2025-06-26 RX ADMIN — FENTANYL CITRATE 50 MCG: 50 INJECTION, SOLUTION INTRAMUSCULAR; INTRAVENOUS at 10:54

## 2025-06-26 RX ADMIN — SODIUM CHLORIDE, POTASSIUM CHLORIDE, SODIUM LACTATE AND CALCIUM CHLORIDE: 600; 310; 30; 20 INJECTION, SOLUTION INTRAVENOUS at 08:10

## 2025-06-26 RX ADMIN — NOREPINEPHRINE BITARTRATE 0.1 MCG/KG/MIN: 1 INJECTION, SOLUTION, CONCENTRATE INTRAVENOUS at 09:09

## 2025-06-26 RX ADMIN — FENTANYL CITRATE 50 MCG: 50 INJECTION, SOLUTION INTRAMUSCULAR; INTRAVENOUS at 10:05

## 2025-06-26 RX ADMIN — IOHEXOL 100 ML: 350 INJECTION, SOLUTION INTRAVENOUS at 11:08

## 2025-06-26 RX ADMIN — MIDAZOLAM HYDROCHLORIDE 2 MG: 1 INJECTION, SOLUTION INTRAMUSCULAR; INTRAVENOUS at 08:10

## 2025-06-26 RX ADMIN — LIDOCAINE HYDROCHLORIDE 4 ML: 40 SOLUTION TOPICAL at 08:21

## 2025-06-26 RX ADMIN — OXYCODONE HYDROCHLORIDE 5 MG: 5 SOLUTION ORAL at 12:50

## 2025-06-26 RX ADMIN — ETOMIDATE 10 MG: 2 INJECTION, SOLUTION INTRAVENOUS at 08:20

## 2025-06-26 RX ADMIN — ROCURONIUM BROMIDE 25 MG: 10 INJECTION INTRAVENOUS at 10:04

## 2025-06-26 RX ADMIN — HEPARIN SODIUM: 1000 INJECTION, SOLUTION INTRAVENOUS; SUBCUTANEOUS at 08:55

## 2025-06-26 RX ADMIN — ROCURONIUM BROMIDE 10 MG: 10 INJECTION INTRAVENOUS at 10:53

## 2025-06-26 RX ADMIN — ONDANSETRON 8 MG: 2 INJECTION INTRAMUSCULAR; INTRAVENOUS at 11:05

## 2025-06-26 RX ADMIN — CALCIUM CHLORIDE 200 MG: 100 INJECTION, SOLUTION INTRAVENOUS; INTRAVENTRICULAR at 09:02

## 2025-06-26 RX ADMIN — SUGAMMADEX 200 MG: 100 INJECTION, SOLUTION INTRAVENOUS at 11:16

## 2025-06-26 ASSESSMENT — PAIN DESCRIPTION - PAIN TYPE
TYPE: ACUTE PAIN
TYPE: ACUTE PAIN

## 2025-06-26 ASSESSMENT — FIBROSIS 4 INDEX: FIB4 SCORE: 1.025641025641025641

## 2025-06-26 NOTE — PROGRESS NOTES
1123- Report received. Patient arrived to PACU. Attached to monitors. Verified parameters and alarms. TR band to right radial. TR band has 13 cc remaining per report. Right radial pulse 2+. Site is CDI. Right femoral site CDI and soft, right pedal pulse 2+.     1126- Oral airway discontinued. Equal and unlabored respirations. SPO2 98% on 10 liters of oxygen via mask. Patient denies pain, numbness, and tingling.     1131- EKG at bedside    1142- right radial arterial line removed, manual pressure applied for 5 minutes. No bleeding or hematoma observed. Gauze and coban applied       1205- Patient tolerated PO fluids     1207- Update provided to wife, Samanta. All questions answered      1315- 3 cc removed from TR band. No oozing, bruising, or firmness observed. Site is CDI and soft. Right radial pulse 2+. Patient denies pain, numbness, and tingling.    1330- 3 cc removed from TR band. No air remaining in TR band, TR band removed. Gauze and Tegaderm applied. No oozing, bruising, or firmness observed. Site is CDI and soft. Right radial pulse 2+. Patient denies pain, numbness, and tingling.     1434- Belongings returned to patient     1520- Bed rest completed per orders. Patient ambulated to restroom with steady gait. No bleeding or firmness observed at groin site post ambulation. Right groin site is CDI and soft     1525- MD Daiz at bedside providing update     1530- Patient tolerated PO fluids and snack    1605- Site is clean, dry, and intact. Discharge instructions provided to patient and relative. Discussed follow up appointments, diet, medications and activity. Patient states understanding. IV was removed. Copy of discharge provided to the patient. A signed copy of discharge is in patient's chart. All personal belongings are in patients possession. All questions have been answered.     1618- Patient wheeled off floor by RN

## 2025-06-26 NOTE — ANESTHESIA PROCEDURE NOTES
Arterial Line    Performed by: Chito Atwood M.D.  Authorized by: Chito Atwood M.D.    Start Time:  6/26/2025 8:29 AM  End Time:  6/26/2025 8:30 AM  Localization: surface landmarks    Patient Location:  OR  Indication: continuous blood pressure monitoring        Catheter Size:  20 G  Seldinger Technique?: Yes    Laterality:  Left  Site:  Radial artery  Line Secured:  Antimicrobial disc, tape and transparent dressing  Events: patient tolerated procedure well with no complications

## 2025-06-26 NOTE — ANESTHESIA PROCEDURE NOTES
Airway    Date/Time: 6/26/2025 8:21 AM    Performed by: Chito Atwood M.D.  Authorized by: Chito Atwood M.D.    Location:  OR  Urgency:  Elective  Difficult Airway: No    Indications for Airway Management:  Anesthesia      Spontaneous Ventilation: absent    Sedation Level:  Deep  Preoxygenated: Yes    Patient Position:  Sniffing  Final Airway Type:  Endotracheal airway  Final Endotracheal Airway:  ETT  Cuffed: Yes    Technique Used for Successful ETT Placement:  Direct laryngoscopy  Devices/Methods Used in Placement:  Intubating stylet    Insertion Site:  Oral  Blade Type:  Cristopher  Laryngoscope Blade/Videolaryngoscope Blade Size:  4  ETT Size (mm):  8.0  Measured from:  Teeth  ETT to Teeth (cm):  23  Placement Verified by: auscultation and capnometry    Cormack-Lehane Classification:  Grade IIa - partial view of glottis  Number of Attempts at Approach:  1

## 2025-06-26 NOTE — ANESTHESIA PREPROCEDURE EVALUATION
Anesthesia Start Date/Time: 06/26/25 0810    Scheduled providers: Matthew Diaz M.D.; Chito Atwood M.D.    Procedure: CL-CHRONIC TOTAL OCCLUSION    Diagnosis:       Chest pain, unspecified type [R07.9]      Chronic total occlusion of coronary artery [I25.82]    Indications: See Associated Dx    Location: [facility] - Cath Lab - [facility]          Chronic anticoagulation/antiplatelet    Relevant Problems   CARDIAC   (positive) Chronic total occlusion of coronary artery - mid LAD      Other   (positive) BMI 34.0-34.9,adult   (positive) Bipolar 1 disorder (HCC)   (positive) Dyslipidemia   (positive) Generalized anxiety disorder   (positive) History of inferior wall myocardial infarction   (positive) Obesity (BMI 30-39.9)       Physical Exam    Airway   Mallampati: II  TM distance: >3 FB  Neck ROM: full       Cardiovascular - normal exam  Rhythm: regular  Rate: normal    (-) murmur     Dental     (+) upper dentures      Very poor dentition     Pulmonary - normal examBreath sounds clear to auscultation     Abdominal    Neurological - normal exam                   Anesthesia Plan    ASA 3   ASA physical status 3 criteria: CAD (>3 months), a thrombophilic disease requiring anticoagulation, MI or angina - history (> 3 months) and Stents (> 3 months)    Plan - general       Airway plan will be ETT          Induction: intravenous    Postoperative Plan: Postoperative administration of opioids is intended.    Pertinent diagnostic labs and testing reviewed    Informed Consent:    Anesthetic plan and risks discussed with patient and spouse.

## 2025-06-26 NOTE — DISCHARGE INSTRUCTIONS
POST ANGIOGRAM  General Care Instructions  Maintain a bandage over the incision site for 24 hours.  It's normal to find a small bruise or dime-sized lump at the insertion site. This should disappear within a few weeks.  Do not apply lotions or powders to the site.  Do not immerse the catheter insertion site in water (bathtub/swimming) for five days. It is ok to shower 24 hours after the procedure.  You may resume your normal diet immediately; on the day of your procedure, drink 6-10 glasses of water to help flush the contrast liquid out of your system.  If the doctor inserted the catheter in through your groin:  Walking short distances on a flat surface is OK. Limit going up/down stairs for the first 2 days.  DO NOT do yard work, drive, squat, lift heavy objects, or play sports for 2 days; or until your health care provider tells you it is OK.  If the doctor inserted the catheter in your arm:  For 3 days, DO NOT lift anything heavier than 10 pounds (approximately a gallon of milk). DO NOT do any heavy pushing, pulling, or twisting.    Medications  If your current medications need to be changed, you will be provided with an updated list of your medications prior to discharge.  If you take warfarin (Coumadin), resume taking your usual dose the evening after the procedure.  DO NOT STOP taking prescribed blood thinning (anti-platelet) medications unless instructed by your cardiologist.  These medications include:  Aspirin, Clopidogrel (Plavix), Ticagrelor (Brilinta), or Prasugrel (Effient)   If you take one of the following anticoagulants, RESUME 24 HOURS after your procedure:  Apixiban (Eliquis), Rivaroxaban (Xarelto), Dabigatran (Pradaxa), Edoxaban (Savaysa)  If you take metformin (Glucophage), RESUME 48 HOURS after your procedure.    When to call your healthcare provider  Call your cardiologist right away at [phone] if you have any of the following:   Problems/Concerns taking any of your prescribed heart  medicines.   The insertion site has increasing pain, swelling, redness, bleeding, or drainage.   Your arm or leg below where the insertion site changes color, is cool, or is numb.   You have chest pain or shortness of breath that does not go away with rest.   Your pulse feels irregular -- very slow (less than 60 beats/minute) or very fast (over 100 beats/minute).   You have dizziness, fainting, or you are very tired.   You are coughing up blood or yellow or green mucus.   You have chills or a fever over 101°F (38.3°C).    If there is bleeding at the catheter insertion site, apply pressure for 10 minutes.  If bleeding persists, call 911, and continue to hold pressure until advanced medical support arrives.        Exercising Safely After Percutaneous Coronary Intervention (PCI)  After percutaneous coronary intervention (PCI), which involves angioplasty and often stenting, it's important to focus on your heart health. Exercise can help strengthen your heart. It can also help you feel good and improve your overall health. Talk with your health care provider or cardiac rehab team member about good options for you.  Start slowly. Work up to more vigorous exercise as you get stronger. Aim for at least 150 minutes of exercise each week.  Include aerobic activities. These make the heart beat faster. They work the heart and lungs, and improve the body's ability to use oxygen. Good choices include walking, swimming, and biking .  Always follow your doctor's recommendation for exercise.   You have been referred to cardiac rehabilitation, which is important for your recovery.  You may contact [facility]'s Intensive [facility] Re[facility] at [phone] to learn more and schedule a visit.        Lifestyle Management After Percutaneous Coronary Intervention (PCI)  Percutaneous coronary intervention (PCI)  involves angioplasty and often stenting. This procedure can open arteries and relieve symptoms. But, it doesn't cure coronary artery  disease. New blockages can still form. You need to take steps to prevent this by managing risk factors. Doing so will help make your heart and arteries healthier. Your doctor may prescribe cardiac rehabilitation to help with this lifelong process.  Understanding risk factors  Some risk factors for coronary artery disease can be controlled. These include smoking, high blood pressure, cholesterol, diabetes, and obesity. They can be managed with medication, diet, and exercise. Support and counseling can also play a role. The effort will pay off! Managing risk factors can help you be more active, feel better, and reduce the risk of heart attack.    If you smoke, quit!  If your doctor has been urging you to quit smoking, it's for good reasons. Smoking damages your heart, blood vessels, and lungs. The good news is that quitting can halt or even reverse the damage of smoking. To quit now:  Get medical help. Ask your doctor for advice on stop-smoking programs. Also ask about medications or nicotine replacement therapy products that may help you quit smoking.  Get support. Join a support group. Ask for help from your family and friends.  Don't give up. It often takes several tries to succeed in quitting smoking.  Avoid secondhand smoke. Ask family and friends not to smoke around you.

## 2025-06-26 NOTE — ANESTHESIA POSTPROCEDURE EVALUATION
Patient: Moncho Mccall    Procedure Summary       Date: 06/26/25 Room / Location: [facility] - Cath Lab [facility]    Anesthesia Start: 0810 Anesthesia Stop: 1124    Procedure: CL-CHRONIC TOTAL OCCLUSION Diagnosis:       Chest pain, unspecified type      Chronic total occlusion of coronary artery      (See Associated Dx)    Scheduled Providers: Matthew Diaz M.D.; Chito Atwood M.D. Responsible Provider: Chito Atwood M.D.    Anesthesia Type: general ASA Status: 3            Final Anesthesia Type: general  Last vitals  BP   Blood Pressure: 105/71    Temp   36.2 °C (97.1 °F)    Pulse   (!) 52   Resp   16    SpO2   94 %      Anesthesia Post Evaluation    Patient location during evaluation: PACU  Level of consciousness: awake and alert    Airway patency: patent  Anesthetic complications: no  Cardiovascular status: hemodynamically stable  Respiratory status: acceptable  Hydration status: euvolemic    PONV: none          No notable events documented.     Nurse Pain Score: 4 (NPRS)

## 2025-06-26 NOTE — OR NURSING
@1230 2 cc removed from TR band. Site is clean dry intact and soft, right radial pulse is 2+    @1245 3cc removed from TR band. Site is clean dry intact and soft. Right radial pulse is 2+    @1300 3cc removed from TR band. Site is clean dry intact and soft. Right radial pulse is 2+

## 2025-06-26 NOTE — PROCEDURES
Cardiac Catheterization Procedure Note    DATE: 6/26/2025    : Matthew Diaz MD    PROCEDURES PERFORMED:  Left heart catheterization  Dual-injection coronary angiography  Intravascular ultrasound of the left anterior descending coronary artery  Successful intracoronary lithotripsy and percutaneous coronary intervention to the subtotal chronic occlusion of the mid-distal left anterior descending coronary  Successful percutaneous coronary intervention to the mid left anterior descending coronary artery/second diagonal branch bifurcation using a culotte technique  Moderate conscious sedation    INDICATIONS:  The patient is a 49-year-old gentleman who presents for planned attempt at percutaneous coronary intervention to the 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 Signed informed consent was obtained and placed in the chart prior to the procedure.  A timeout was performed prior to beginning the procedure.    MEDICATIONS:  Lidocaine  Nitroglycerin  Verapamil  Heparin  Norepinephrine    SEDATION:  See Anesthesia record    CONTRAST: Omnipaque 100 cc    ACCESS:   6/7-Libyan Glidesheath in the right radial artery.  7-Libyan Destination sheath in the right femoral artery    ESTIMATED BLOOD LOSS: 30 cc    COMPLICATIONS: None    PROCEDURE IN DETAIL:  The patient was brought to the cardiac catheterization laboratory in the fasting state.  The skin over the right wrist and right groin was prepped and draped in the usual sterile fashion. Lidocaine infiltration was used to anesthetize the tissue over the right radial and right femoral arteries.  Using the micropuncture technique, a 6/7-Libyan Glidesheath was inserted in the right radial artery and a 7-Libyan Destination sheath was inserted in the right femoral artery.  A 6-Libyan JR4 guide catheter was then advanced over a  standard J-wire into the left ventricular cavity where it was gently aspirated, flushed, and then withdrawn across the aortic valve with sequential pressures measured.  This catheter was then used to engage the ostium of the right coronary artery and a 7-Peruvian EBU-3.5 guide catheter was used to engage the ostium of the left main coronary artery.  Planning cineangiograms were obtained and we proceeded with intravascular ultrasound.    A Run-through wire wire was then advanced through a Mamba Red microcatheter across the occlusion and was placed in the distal aspect of the left anterior descending coronary.  An OptiCross IVUS catheter was then advanced over the guidewire and IVUS was performed.  IVUS demonstrated extensive plaque in the distal vessel with a very small luminal area, possibly partially related to vasospasm.  There was mixed fibrotic and calcific plaque in the mid aspect of the vessel with a large calcium nodule just proximal to the second diagonal branch.  The vessel landing zone in the mid vessel had a luminal diameter of approximately 3.8 mm² and a true vessel diameter of approximately 5.0 mm².  More proximally there was also significant mixed fibrotic and calcific plaque with reasonably preserved luminal area greater than 5 mm² at the most significant area of stenosis.    Following IVUS, the second diagonal branch was protected with a Prowater wire and then 2.0 x 20 mm compliant balloon was then used to predilate the LAD and D2.  Further predilation in the LAD near the calcium nodule was then performed with a 3.0 x 15 mm compliant balloon without adequate lesion yield.  Therefore, intracoronary lithotripsy was performed with a 3.0 mm Shockwave balloon with delivery of 60 pulses across the lesion.    Following lithotripsy, it did appear that bifurcation stenting would be necessary to preserve D2.  The IVUS catheter was used to evaluate D2, which demonstrated a small area of very severe fibrotic  plaque in the proximal vessel with only eccentric calcific plaque more proximally extending to the ostium with a true vessel diameter of approximately 2.5 mm².  A 2.0 x 20 mm noncompliant balloon was then used to further predilate D2 with excellent lesion yield.  A 2.0 x 26 mm Elías drug-eluting stent was then deployed from the mid LAD into the proximal D2 at a maximum pressure of 16 nuno.  The stent balloon was pulled back in the proximal edge of the stent was expanded at 24 nuno.  We attempted to rewire through the stent struts into the ongoing LAD with another Runthrough wire, however, the wire continually ran behind the stent struts.  Therefore, the LAD aspect of the stent was postdilated with a 3.0 x 12 mm compliant balloon.  The Run-through wire was then able to be advanced through the stent struts, which were opened with a 2.5 x 12 mm compliant balloon.  The 2.0 x 12 mm compliant balloon was then used to further predilate the distal LAD and then a 2.5 x 24 mm Synergy drug-eluting stent was then deployed across the mid LAD at a maximum pressure of 16 nuno.  IVUS was then repeated, which demonstrated that the stent edges had landed in the intended zones with moderate underexpansion of the mid aspect of the stent and mild underexpansion of the proximal aspect of the stent.  There was no evidence of significant dissection in the distal LAD that had been treated with angioplasty.  After postdilation with 3.0 x 15 mm and a 3.5 x 12 mm noncompliant balloons at high pressure proximally, a 1.2 x 8 mm compliant balloon was able to be advanced across the stent struts into D2, which are then opened at 20 nuno.  A 2.0 x 8 mm compliant balloon was then used to further open the stent struts at 20 nuno.  Kissing balloon inflation was then performed at nominal pressure with a 2.5 x 12 mm noncompliant balloon in D2 and a 3.0 x 15 mm noncompliant balloon in the LAD.  Final cineangiograms were then obtained in orthogonal views, which  demonstrated excellent stent expansion with no evidence of wire perforation, thrombus, or dissection.  The distal LAD remains significantly diseased and continued to receive collateral flow from the RCA.    At the completion of the case, all wires, catheters, and sheaths were removed.  A TR band was placed over the right radial artery using the patent hemostasis technique.  An 8-[place]ian Angio-Seal device was deployed in the right femoral artery.    HEMODYNAMICS:   Aortic pressure: 80/49 mmHg  Pre A-wave pressure: 15 mmHg  No significant aortic gradient on pullback    DUAL-INJECTION CORONARY ANGIOGRAPHY:  Dual injection coronary angiography demonstrated a subtotal occlusion of the mid-distal left anterior descending coronary artery with robust collateral flow primarily supplied to the distal LAD by a large epicardial collateral from an RV marginal branch.  The proximal LAD had partially calcified ectatic 40% disease.  The first diagonal branch had ostial 30% disease and mid 90% disease.  The second diagonal branch had ostial to proximal 90% disease.  The left circumflex coronary artery distribution was not well-visualized, but there appeared to be diffuse severe disease in the major obtuse marginal branch.  The right coronary artery had widely patent proximal to distal vessel stents and supplied by a small posterior descending coronary and a moderate posterolateral branch with luminal irregularities.    INTRAVASCULAR ULTRASOUND:  See IVUS findings and procedure details above.    PERCUTANEOUS CORONARY INTERVENTION:  Lesion #1:  Lesion location: Mid left anterior descending chronic sub-total occlusion.  Lesion type: C  Lesion length: 20 mm  Preintervention SANCHEZ flow: 2  Postintervention SANCHEZ flow: 3  Preintervention stenosis: 99%  Postintervention stenosis: 0%  Stent: 2.5 x 24 mm Synergy drug-eluting stent    Lesion #2:  Lesion location: Proximal second diagonal branch/mid left anterior descending coronary  bifurcation  Lesion type: C  Lesion length: 15 mm  Preintervention SANCHEZ flow: 3  Postintervention SANCHEZ flow: 3  Preintervention stenosis: 90%   Postintervention stenosis: 0%  Stent: 2.0 x 26 mm Elías drug-eluting stent deployed using a culotte bifurcation technique    IMPRESSION:  Successful percutaneous coronary intervention to the subtotal chronic occlusion of the mid-distal left anterior descending coronary with one 2.5 x 24 mm Synergy drug-eluting stent postdilated to 3.5 mm proximally requiring unusual time and procedural services due to need for bifurcation stenting of the second diagonal branch using a 2.0 x 26 mm Elías drug-eluting stent postdilated to 2.5 mm proximally.  Borderline elevated left heart filling pressures.    RECOMMENDATIONS:  Recover in post procedure unit with plan for same-day discharge if stable.  TR band release per protocol.  Bedrest for 4 hours.  Continue dual antiplatelet therapy with aspirin and ticagrelor.  Follow-up with [facility] for completion of revascularization in 2-3 months after allowing for healing.    NOTIFICATION:  The patient's family was called and notified of the results of his cardiac catheterization.

## 2025-06-26 NOTE — ANESTHESIA TIME REPORT
Anesthesia Start and Stop Event Times       Date Time Event    6/26/2025 0711 Ready for Procedure     0810 Anesthesia Start     1124 Anesthesia Stop          Responsible Staff  06/26/25      Name Role Begin End    Chito Atwood M.D. Anesth 0810 1124          Overtime Reason:  no overtime (within assigned shift)    Comments: